# Patient Record
Sex: FEMALE | Race: OTHER | Employment: FULL TIME | URBAN - METROPOLITAN AREA
[De-identification: names, ages, dates, MRNs, and addresses within clinical notes are randomized per-mention and may not be internally consistent; named-entity substitution may affect disease eponyms.]

---

## 2018-11-29 ENCOUNTER — HOSPITAL ENCOUNTER (EMERGENCY)
Age: 43
Discharge: HOME | End: 2018-11-29
Attending: PHYSICIAN ASSISTANT
Payer: SELF-PAY

## 2018-11-29 VITALS
SYSTOLIC BLOOD PRESSURE: 112 MMHG | HEART RATE: 81 BPM | DIASTOLIC BLOOD PRESSURE: 89 MMHG | OXYGEN SATURATION: 98 % | RESPIRATION RATE: 14 BRPM | WEIGHT: 179 LBS | HEIGHT: 62 IN | TEMPERATURE: 98.3 F | BODY MASS INDEX: 32.94 KG/M2

## 2018-11-29 DIAGNOSIS — Z01.419 NORMAL PELVIC EXAM: Primary | ICD-10-CM

## 2018-11-29 PROCEDURE — 99283 EMERGENCY DEPT VISIT LOW MDM: CPT

## 2018-11-30 NOTE — DISCHARGE INSTRUCTIONS
Examen pélvico: Instrucciones de cuidado - [ Pelvic Exam: Care Instructions ]  Instrucciones de cuidado    Cuando lisa Leonette Marylou se llama examen pélvico. Dos buenas razones para hacerse esta clase de examen son detectar infecciones de transmisión sexual (STI, por echo siglas en inglés) y hacerse louisa prueba de Papanicolaou. [place] prueba de Papanicolaou detecta cambios iniciales que pueden conducir a cáncer de isaias uterino. A veces, un examen pélvico es parte de un control regular. En [employer], usted puede hacer algunas cosas para que los [place] de echo pruebas tanner tan precisos reggie sea posible. · Trate de programar lisa examen cuando no tenga lisa período. · No se rose lavados vaginales, ni use tampones, medicamentos vaginales, aerosoles o talcos por 24 horas antes de lisa examen. · No tenga relaciones sexuales por 24 horas antes de lisa examen. Otras veces, las mujeres que tienen esta clase de exámenes en cualquier momento del mes. [place] es porque tienen dolor pélvico, sangrado o secreción. O pueden tener otro problema pélvico.  Antes del examen, es importante que comparta cierta información con lisa médico. Por ejemplo, si es louisa sobreviviente de louisa violación o de abuso sexual, usted puede hablar acerca de cualquier inquietud que pueda tener. Lisa médico también querrá saber si usted está embarazada o si [place] algún anticonceptivo. Y querrá saber de cualquier problema, cirugía o procedimiento que haya tenido en lisa georgia pélvica. También tendrá que decirle a lisa médico cuándo tuvo lisa último período. La atención de seguimiento es louisa parte clave de lisa tratamiento y seguridad. Asegúrese de hacer y acudir a todas las citas, y llame a lisa médico si está teniendo problemas. También es louisa buena idea saber los resultados de echo exámenes y mantener louisa lista de los medicamentos que get.   ¿Cómo se hace un examen pélvico?  · Chanell el examen pélvico usted:  ? Se quitará la ropa de Birtha Rose para abajo. María Schneiders un cobertor de papel o de ijeoma para ponerse sobre la mitad inferior de lisa cuerpo. ? Estará Ricka Ana boca arriba en la green de examen. Tendrá las piernas elevadas por encima de la línea de lisa cuerpo. Apoyará los pies sobre estribos. · El médico:  ? Le pedirá que relaje Keli Burow. Tiene que abrir las rodillas apuntando [employer] ku. ? Revisará la abertura de lisa vagina para detectar llagas o hinchazón. ? Le colocará suavemente en la vagina un instrumento llamado espéculo. Julisa abre la vagina un poco. Usted sentirá algo de presión. [place] si se relaja, no dolerá. Luis Llorens Torres le permite a lisa médico arron el interior de la vagina. ? Usará un cepillo pequeño, louisa espátula o un hisopo de algodón para sasha Pat Mars de células, si le va a hacer louisa prueba de Papanicolaou o un cultivo. Después el médico extraerá el espéculo. ? Se pondrá guantes y le colocará en la vagina fany o dos dedos de Amber. La otra mano va sobre la parte inferior de lisa abdomen. Luis Llorens Torres le permite a lisa médico sentir ehco [place]. Probablemente sienta algo de presión. Trate de permanecer relajada. ? Le pondrá un dedo enguantado en el recto y otro en la vagina, si es necesario. [id number] [facility] puede ser útil para revisar los [place]. Julisa examen dura aproximadamente 10 minutos. Al final, le darán louisa toallita o un pañuelo de papel para que se limpie la georgia vaginal. Es normal que tenga alguna secreción después de julisa examen. Consuelo Search vestirse. Los resultados de algunos de los exámenes pueden estar disponibles de inmediato. [place] los resultados de un cultivo o prueba de Papanicolaou pueden tardar desde unos días hasta un par de semanas. ¿Por qué debería hacerse un examen pélvico?  · Usted quiere hacerse las pruebas de detección recomendadas. Luis Llorens Torres incluye louisa prueba de Papanicolaou.   · Usted piensa que tiene louisa infección vaginal. Las señales incluyen picazón, ardor o louisa secreción fuera de lo normal.  · Podría mikey estado expuesta a louisa infección de transmisión sexual (STI, por echo siglas en inglés), reggie clamidia o herpes. · Tiene sangrado vaginal fuera de lisa período menstrual normal.  · Tiene dolor en el abdomen o la pelvis. · Ha sido víctima de [facility] agresión sexual. El examen pélvico le permite al médico recoger evidencias y revisar si hay STI. · Está embarazada. · Tiene problemas para Chelieffie Kay. ¿Cuáles son los riesgos de un examen pélvico?  El examen pélvico no tiene riesgos. ¿Cuándo debe pedir ayuda? Llame al 911 en cualquier momento que considere que necesita atención de [place]. Por ejemplo, llame si:    · Se desmayó (perdió el conocimiento).     · Tiene un dolor repentino e intenso en el abdomen o la pelvis.    Llame a lisa médico ahora mismo o busque atención médica inmediata si:    · Tiene un dolor nuevo en el abdomen o la pelvis.     · Tiene fiebre y dolor pélvico o flujo vaginal.    Preste especial atención a los cambios en lisa lay y asegúrese de comunicarse con lisa médico si:    · El dolor faby las relaciones sexuales aumenta.     · No mejora reggie se esperaba. ¿Dónde puede encontrar más información en inglés? Stephen Jeffrey a http://orion-tena.info/. Mark Samantha O554 en la búsqueda para aprender más acerca de \"Examen pélvico: Instrucciones de cuidado - [ Pelvic Exam: Care Instructions ]. \"  Revisado: 15 aguilar, 2018  Versión del contenido: 11.8  © 0053-8169 [employer]wise, Incorporated. Las instrucciones de cuidado fueron adaptadas bajo licencia por [employer] (which disclaims liability or warranty for this information). Si usted tiene [place] afección médica o sobre estas instrucciones, siempre pregunte a lisa profesional de lay. [facility] niega toda garantía o responsabilidad por lisa uso de esta información.

## 2018-11-30 NOTE — ED PROVIDER NOTES
EMERGENCY DEPARTMENT HISTORY AND PHYSICAL EXAM 
 
Date: 11/29/2018 Patient Name: Danielle Gordon History of Presenting Illness Chief Complaint Patient presents with  Foreign Body in Vagina History Provided By: Patient Chief Complaint: Missing tampon Duration: 7 hours Timing:  Acute Location: Vagina Associated Symptoms: denies any other associated signs or symptoms Additional History (Context):  
8:16 PM 
Danielle Gordon is a 44 y.o. female who presents to the emergency department C/O missing tampon from vagina, onset 7 hours ago. Pt states that she placed a tampon 8 hours ago and checked an hour later but did not find it. Pt is currently on menses. Pt denies vaginal pain, fever, chills, or any other sxs or complaints. PCP: Regan Morel MD 
 
Current Outpatient Medications Medication Sig Dispense Refill  amoxicillin 500 mg tab Take 500 mg by mouth three (3) times daily. 30 Tab 0  
 traMADol (ULTRAM) 50 mg tablet Take 1 Tab by mouth every six (6) hours as needed for Pain. Max Daily Amount: 200 mg. 30 Tab 0  
 albuterol (PROVENTIL HFA, VENTOLIN HFA) 90 mcg/actuation inhaler Take 1 Puff by inhalation every four (4) hours as needed. Past History Past Medical History: 
Past Medical History:  
Diagnosis Date  Asthma  Chronic kidney disease   
 hx stones  GERD (gastroesophageal reflux disease)  Psychiatric disorder   
 depression/anxiety  Unspecified adverse effect of anesthesia 14yrs ago states \"heart stopped\" with anest. during c/s in [place] Past Surgical History: 
Past Surgical History:  
Procedure Laterality Date  HX GYN    
 c/s x 2  
 HX ORTHOPAEDIC    
 rt foot surgery Family History: 
History reviewed. No pertinent family history. Social History: 
Social History Tobacco Use  Smoking status: Passive Smoke Exposure - Never Smoker  Tobacco comment: smokes when drinking at times Substance Use Topics  Alcohol use: Yes Alcohol/week: 3.0 oz Types: 6 Cans of beer per week Comment: per week  Drug use: No  
 
 
Allergies: Allergies Allergen Reactions  Percocet [Oxycodone-Acetaminophen] Nausea and Vomiting Pt said she can take percocet.  Vicodin [Hydrocodone-Acetaminophen] Nausea and Vomiting Review of Systems Review of Systems Constitutional: Negative for chills and fever. Genitourinary: Positive for vaginal bleeding (on menses). Negative for vaginal pain. All other systems reviewed and are negative. Physical Exam  
 
Vitals:  
 11/29/18 1952 BP: 112/89 Pulse: 81 Resp: 14 Temp: 98.3 °F (36.8 °C) SpO2: 98% Weight: 81.2 kg (179 lb) Height: 5' 2\" (1.575 m) Physical Exam  
Constitutional: She is oriented to person, place, and time. She appears well-developed and well-nourished. Alert, non toxic HENT:  
Head: Normocephalic and atraumatic. Neck: Normal range of motion. Neck supple. Cardiovascular: Normal rate, regular rhythm, normal heart sounds and intact distal pulses. No murmur heard. Pulmonary/Chest: Effort normal and breath sounds normal. No respiratory distress. She has no wheezes. She has no rales. Abdominal: Soft. Bowel sounds are normal. There is no tenderness. Genitourinary:  
Genitourinary Comments: + vaginal bleeding, no FB or tampon seen Neurological: She is alert and oriented to person, place, and time. Psychiatric: She has a normal mood and affect. Judgment normal.  
Nursing note and vitals reviewed. Diagnostic Study Results Labs - No results found for this or any previous visit (from the past 12 hour(s)). Radiologic Studies - No orders to display CT Results  (Last 48 hours) None CXR Results  (Last 48 hours) None Medical Decision Making I am the first provider for this patient.  
 
I reviewed the vital signs, available nursing notes, past medical history, past surgical history, family history and social history. Vital Signs-Reviewed the patient's vital signs. Pulse Oximetry Analysis - 98% on RA Records Reviewed: Nursing Notes Discussion: pt currently on menstrual period, sts she used a tampon and was unable to finding it when she went to remove it. + vaginal bleeding with mild lower abd cramping which is typical with her periods. No FB or tampon seen on exam.  
 
Procedures: 
Pelvic Exam 
Date/Time: 11/29/2018 8:19 PM 
Performed by: PA 
Procedure duration:  5 minutes. Documented by:  MARCELINO Allen. As dictated by:  Constance Landrum PA-C Exam assisted by:  Jennifer Avalos. Type of exam performed: speculum. External genitalia appearance: normal.   
Vaginal exam:  normal.   
Cervical exam:  normal.   
Specimen(s) collected:  none. Patient tolerance: Patient tolerated the procedure well with no immediate complications Comments: No tampon in vagina MEDICATIONS GIVEN: 
Medications - No data to display ED Course:  
8:16 PM  
Initial assessment performed. The patients presenting problems have been discussed, and they are in agreement with the care plan formulated and outlined with them. I have encouraged them to ask questions as they arise throughout their visit. Diagnosis and Disposition DISCHARGE NOTE: 
8:28 PM 
Max Muniretelvina Sheila's  results have been reviewed with her. She has been counseled regarding her diagnosis, treatment, and plan. She verbally conveys understanding and agreement of the signs, symptoms, diagnosis, treatment and prognosis and additionally agrees to follow up as discussed. She also agrees with the care-plan and conveys that all of her questions have been answered.   I have also provided discharge instructions for her that include: educational information regarding their diagnosis and treatment, and list of reasons why they would want to return to the ED prior to their follow-up appointment, should her condition change. She has been provided with education for proper emergency department utilization. CLINICAL IMPRESSION: 
 
1. Normal pelvic exam   
 
 
PLAN: 
1. D/C Home 2. Discharge Medication List as of 11/29/2018  8:28 PM  
  
 
3. Follow-up Information Follow up With Specialties Details Why Contact Info Gabriela Thomas MD [facility] Schedule an appointment as soon as possible for a visit in 3 days For primary care follow up [facility] [address] [id number] 
[phone] [facility] EMERGENCY DEPT Emergency Medicine  As needed, If symptoms worsen [address] [id number] 
[phone]  
  
 
_______________________________ Attestations: This note is prepared by THEMA, acting as Scribe for Osmar Diop PA-C. Osmar Diop PA-C:  The scribe's documentation has been prepared under my direction and personally reviewed by me in its entirety. I confirm that the note above accurately reflects all work, treatment, procedures, and medical decision making performed by me. 
 
_______________________________

## 2019-02-05 ENCOUNTER — HOSPITAL ENCOUNTER (EMERGENCY)
Age: 44
Discharge: HOME | End: 2019-02-05
Attending: EMERGENCY MEDICINE
Payer: MEDICAID

## 2019-02-05 VITALS
OXYGEN SATURATION: 100 % | HEIGHT: 62 IN | HEART RATE: 80 BPM | TEMPERATURE: 98.2 F | BODY MASS INDEX: 31.83 KG/M2 | SYSTOLIC BLOOD PRESSURE: 132 MMHG | RESPIRATION RATE: 18 BRPM | DIASTOLIC BLOOD PRESSURE: 88 MMHG | WEIGHT: 173 LBS

## 2019-02-05 DIAGNOSIS — N64.4 PAIN OF BOTH BREASTS: Primary | ICD-10-CM

## 2019-02-05 PROCEDURE — 93005 ELECTROCARDIOGRAM TRACING: CPT

## 2019-02-05 PROCEDURE — 74011250636 HC RX REV CODE- 250/636: Performed by: PHYSICIAN ASSISTANT

## 2019-02-05 PROCEDURE — 96372 THER/PROPH/DIAG INJ SC/IM: CPT

## 2019-02-05 PROCEDURE — 99284 EMERGENCY DEPT VISIT MOD MDM: CPT

## 2019-02-05 RX ORDER — IBUPROFEN 200 MG
200 TABLET ORAL
COMMUNITY
End: 2019-02-05

## 2019-02-05 RX ORDER — KETOROLAC TROMETHAMINE 30 MG/ML
30 INJECTION, SOLUTION INTRAMUSCULAR; INTRAVENOUS
Status: COMPLETED | OUTPATIENT
Start: 2019-02-05 | End: 2019-02-05

## 2019-02-05 RX ORDER — ACETAMINOPHEN 500 MG
500 TABLET ORAL
Qty: 20 TAB | Refills: 0 | Status: SHIPPED | OUTPATIENT
Start: 2019-02-05 | End: 2019-07-15

## 2019-02-05 RX ORDER — DIPHENHYDRAMINE HCL 25 MG
25 CAPSULE ORAL
COMMUNITY
End: 2019-07-15

## 2019-02-05 RX ORDER — IBUPROFEN 600 MG/1
600 TABLET ORAL
Qty: 20 TAB | Refills: 0 | Status: SHIPPED | OUTPATIENT
Start: 2019-02-05 | End: 2019-07-15

## 2019-02-05 RX ORDER — LIDOCAINE 40 MG/G
CREAM TOPICAL
Qty: 15 G | Refills: 0 | Status: SHIPPED | OUTPATIENT
Start: 2019-02-05 | End: 2019-07-15

## 2019-02-05 RX ADMIN — KETOROLAC TROMETHAMINE 30 MG: 30 INJECTION, SOLUTION INTRAMUSCULAR at 11:46

## 2019-02-05 NOTE — DISCHARGE INSTRUCTIONS
Dolor de senos: Instrucciones de cuidado - [ Breast Pain: Care Instructions ]  Instrucciones de cuidado    La sensibilidad y el dolor de senos podría aparecer y desaparecer con los periodos menstruales (cíclico) o podría no seguir ningún patrón (no cíclico). El dolor de senos chris vez es causado por un problema de lay grave. Podría ser necesario hacer pruebas para averiguar la causa. La atención de seguimiento es louisa parte clave de lisa tratamiento y seguridad. Asegúrese de hacer y acudir a todas las citas, y llame a lisa médico si está teniendo problemas. También es louisa buena idea saber los resultados de echo exámenes y mantener louisa lista de los medicamentos que get. ¿Cómo puede cuidarse en el hogar? · Si lisa médico le recetó un medicamento, tómelo exactamente según las indicaciones. Llame a lisa médico si nesha estar teniendo problemas con lisa medicamento. · Para aliviar el dolor y la hinchazón, tome un analgésico (medicamento para el dolor) de venta arvin, reggie acetaminofén (Tylenol), ibuprofeno (Advil, Motrin) o naproxeno (Aleve). Kay y siga todas las instrucciones de la [place]. · No tome dos o más analgésicos al [employer], a menos que el médico se lo haya indicado. Muchos analgésicos contienen acetaminofén, es decir, Tylenol. El exceso de acetaminofén (Tylenol) puede ser dañino. · Use un sostén que le dé buen soporte, reggie los que se usan para hacer deporte o correr. · Reduzca la cantidad de grasa en lisa dieta. Si necesita ayuda para planificar comidas saludables, consulte a un dietista. · Reanna por lo menos 30 minutos de ejercicio la mayoría de los días de la [place]. Caminar es louisa buena opción. Es posible que también quiera hacer otras actividades, reggie correr, nadar, [employer], o jugar al tenis o practicar otros deportes de equipo. · Mantenga un patrón de sueño saludable. Sydney Lipoma a dormir a la misma hora todas las noches y levántese a la misma hora cada día. ¿Cuándo debe pedir ayuda?   Llame a lisa médico ahora mismo o busque atención médica inmediata si:    · Tiene cambios nuevos en un seno, reggie:  ? Un bulto o engrosamiento en el seno o la axila. ? Un cambio en el tamaño o la forma del seno. ? Cambios en la piel, reggie hoyuelos o arrugas. ? Secreción de [employer]. ? Un cambio en el color o la textura de la piel del seno o la georgia oscura alrededor del pezón (areola). ? Un cambio en la forma del pezón (podría parecer reggie si estuviera hundido en el seno).     · Tiene síntomas de infección en el seno, tales reggie:  ? Aumento del dolor, la hinchazón, el enrojecimiento o la temperatura alrededor del seno. ? Vetas rojizas que se extienden desde el seno. ? Pus que supura de un seno. ? Jeison Doll especial atención a los cambios en lisa lay y asegúrese de comunicarse con lisa médico si:    · Lisa dolor de los senos no disminuye después de 1 semana.     · Tiene un bulto o engrosamiento en el seno o la axila. ¿Dónde puede encontrar más información en inglés? Sanket Chamorro a http://orion-tena.info/. Mario Alberto Landry H374 en la búsqueda para aprender más acerca de \"Dolor de senos: Instrucciones de cuidado - [ Breast Pain: Care Instructions ]. \"  Revisado: 23 septiembre, 2018  Versión del contenido: 11.9  © 2356-6789 [employer]. Las instrucciones de cuidado fueron adaptadas bajo licencia por [employer] (which disclaims liability or warranty for this information). Si usted tiene [place] afección médica o sobre estas instrucciones, siempre pregunte a lisa profesional de lay. [employer] niega toda garantía o responsabilidad por lisa uso de esta información.

## 2019-02-05 NOTE — ED PROVIDER NOTES
EMERGENCY DEPARTMENT HISTORY AND PHYSICAL EXAM 
 
Date: 2/5/2019 Patient Name: Rubin Zarate History of Presenting Illness Chief Complaint Patient presents with  Breast pain History Provided By: Patient Chief Complaint: breast pain Duration: 3 Weeks Timing:  Acute Location: R>L Associated Symptoms: breast swelling, breast itching/burning, and chills. Additional History (Context):  
11:23 AM 
Rubin Zarate is a 44 y.o. female with PMHX of right breast excision with wire localization surgery in August 2018 while living in [place]  who presents to the emergency department C/O breast pain (R>L) onset a few months ago but worsened 3 weeks ago. Associated symptoms include breast swelling, breast itching/burning, and chills. . Initially notice drainage from the breast but resolved. Symptoms more significant in the right breast. States the pain is radiating to her chest. Last week, fever 102F and took Motrin with relief. She is taking Tramadol for pain relief. She is supposed to follow up with surgeon every 6 months but since moved to [address] [facility] and pt does not have health insurance. Pt denies any other Sx or complaints. PCP: None Current Outpatient Medications Medication Sig Dispense Refill  diphenhydrAMINE (BENADRYL) 25 mg capsule Take 25 mg by mouth every six (6) hours as needed.  ibuprofen (MOTRIN) 600 mg tablet Take 1 Tab by mouth every six (6) hours as needed for Pain. 20 Tab 0  
 acetaminophen (TYLENOL) 500 mg tablet Take 1 Tab by mouth every four (4) hours as needed for Pain. 20 Tab 0  
 lidocaine (XYLOCAINE) 4 % topical cream Apply  to affected area three (3) times daily as needed for Pain. 15 g 0  
 colloidal oatmeal (EUCERIN ECZEMA RELIEF) 1 % crea Apply thin layer to breasts twice daily. 140 g 0  
 traMADol (ULTRAM) 50 mg tablet Take 1 Tab by mouth every six (6) hours as needed for Pain.  Max Daily Amount: 200 mg. 30 Tab 0  
  albuterol (PROVENTIL HFA, VENTOLIN HFA) 90 mcg/actuation inhaler Take 1 Puff by inhalation every four (4) hours as needed. Past History Past Medical History: 
Past Medical History:  
Diagnosis Date  Asthma  Chronic kidney disease   
 hx stones  GERD (gastroesophageal reflux disease)  Psychiatric disorder   
 depression/anxiety  Unspecified adverse effect of anesthesia 14yrs ago states \"heart stopped\" with anest. during c/s in [place] Past Surgical History: 
Past Surgical History:  
Procedure Laterality Date  HX GYN    
 c/s x 2  
 HX ORTHOPAEDIC    
 rt foot surgery Family History: 
History reviewed. No pertinent family history. Social History: 
Social History Tobacco Use  Smoking status: Current Every Day Smoker Packs/day: 1.00  Smokeless tobacco: Never Used  Tobacco comment: smokes when drinking at times Substance Use Topics  Alcohol use: Yes Alcohol/week: 3.0 oz Types: 6 Cans of beer per week Comment: per week  Drug use: No  
 
 
Allergies: Allergies Allergen Reactions  Percocet [Oxycodone-Acetaminophen] Nausea and Vomiting Pt said she can take percocet.  Vicodin [Hydrocodone-Acetaminophen] Nausea and Vomiting Review of Systems Review of Systems Constitutional: Positive for chills and fever (resolved). Cardiovascular: Positive for chest pain ( breast pain). Musculoskeletal: Positive for myalgias (breast pain). (+) breast swelling 
(+) breast itching/burning All other systems reviewed and are negative. Physical Exam  
 
Vitals:  
 02/05/19 1052 BP: 132/88 Pulse: 80 Resp: 18 Temp: 98.2 °F (36.8 °C) SpO2: 100% Weight: 78.5 kg (173 lb) Height: 5' 2\" (1.575 m) Physical Exam  
Constitutional: She appears well-developed and well-nourished. No distress. HENT:  
Head: Normocephalic and atraumatic.   
Right Ear: External ear normal.  
Left Ear: External ear normal.  
 Nose: Nose normal.  
Eyes: Conjunctivae are normal.  
Neck: Normal range of motion. Cardiovascular: Normal rate, regular rhythm and normal heart sounds. Pulmonary/Chest: Effort normal and breath sounds normal. No respiratory distress. She has no wheezes. Bilateral breasts have reproducible tenderness to palpation, R>L. Minimal scaling of skin near the areola bilaterally. No nipple discharge or obvious dimpling of the breast bilaterally. No masses, induration or fluctuance bilaterally. Neurological: She is alert. Skin: Skin is warm and dry. She is not diaphoretic. Psychiatric: She has a normal mood and affect. Nursing note and vitals reviewed. Diagnostic Study Results Labs - Recent Results (from the past 12 hour(s)) EKG, 12 LEAD, INITIAL Collection Time: 02/05/19 11:40 AM  
Result Value Ref Range Ventricular Rate 64 BPM  
 Atrial Rate 64 BPM  
 P-R Interval 124 ms QRS Duration 84 ms Q-T Interval 416 ms  
 QTC Calculation (Bezet) 429 ms Calculated P Axis 26 degrees Calculated R Axis 0 degrees Calculated T Axis 17 degrees Diagnosis Normal sinus rhythm Normal ECG Radiologic Studies - No orders to display CT Results  (Last 48 hours) None CXR Results  (Last 48 hours) None Medications given in the ED- Medications  
ketorolac (TORADOL) injection 30 mg (30 mg IntraMUSCular Given 2/5/19 1146) Medical Decision Making I am the first provider for this patient. I reviewed the vital signs, available nursing notes, past medical history, past surgical history, family history and social history. Vital Signs-Reviewed the patient's vital signs. Pulse Oximetry Analysis - 100% on RA Records Reviewed: Nursing Notes and Old Medical Records Provider Notes (Medical Decision Making): Appears well hydrated and non toxic, presenting with months of breast pain, worse over the past 3 weeks. Reports itching, burning and scaling skin. No obvious lesions or masses to suggest abscess or cellulitis. EKG unremarkable. Will tx pain. Patient is to follow up with [facility] today at 1500 for further evaluation. Based on today's assessment, I feel the patient is stable for discharge to home with outpatient follow up. Return precautions and referrals provided. Procedures: 
Procedures ED Course:  
11:23 AM Initial assessment performed. The patients presenting problems have been discussed, and they are in agreement with the care plan formulated and outlined with them. I have encouraged them to ask questions as they arise throughout their visit. 11:30 AM Discussed patient's history, exam, and available diagnostics results with Vasiliy Rojas MD, ED attending, who recommends anti-inflammatory  for pain,  consult, and EKG. 11:54 AM Alexandra Bauer, recommends pt follow up with [facility] for further evaluation. Pt given information for [facility] and how to file for Medicaid in [place]. 12:05 PM Alexandra Bauer, states they to get her an appointment with [facility] today at 3pm.  
 
Diagnosis and Disposition DISCHARGE NOTE: 
12:05 PM 
Kaden Frausto Hill's  results have been reviewed with her. She has been counseled regarding her diagnosis, treatment, and plan. She verbally conveys understanding and agreement of the signs, symptoms, diagnosis, treatment and prognosis and additionally agrees to follow up as discussed. She also agrees with the care-plan and conveys that all of her questions have been answered. I have also provided discharge instructions for her that include: educational information regarding their diagnosis and treatment, and list of reasons why they would want to return to the ED prior to their follow-up appointment, should her condition change. She has been provided with education for proper emergency department utilization. CLINICAL IMPRESSION: 
 
1. Pain of both breasts PLAN: 1. D/C Home 2. Discharge Medication List as of 2/5/2019 12:06 PM  
  
START taking these medications Details  
acetaminophen (TYLENOL) 500 mg tablet Take 1 Tab by mouth every four (4) hours as needed for Pain., Print, Disp-20 Tab, R-0  
  
lidocaine (XYLOCAINE) 4 % topical cream Apply  to affected area three (3) times daily as needed for Pain., Print, Disp-15 g, R-0  
  
colloidal oatmeal (EUCERIN ECZEMA RELIEF) 1 % crea Apply thin layer to breasts twice daily. , Print, Disp-140 g, R-0  
  
  
CONTINUE these medications which have CHANGED Details  
ibuprofen (MOTRIN) 600 mg tablet Take 1 Tab by mouth every six (6) hours as needed for Pain., Print, Disp-20 Tab, R-0  
  
  
CONTINUE these medications which have NOT CHANGED Details diphenhydrAMINE (BENADRYL) 25 mg capsule Take 25 mg by mouth every six (6) hours as needed., Historical Med  
  
traMADol (ULTRAM) 50 mg tablet Take 1 Tab by mouth every six (6) hours as needed for Pain. Max Daily Amount: 200 mg., Print, Disp-30 Tab, R-0  
  
albuterol (PROVENTIL HFA, VENTOLIN HFA) 90 mcg/actuation inhaler Take 1 Puff by inhalation every four (4) hours as needed., Historical Med 3. Follow-up Information Follow up With Specialties Details Why Contact Info [address] today For primary care follow up at [address] [facility] [address] [place] [address] [address] 
[phone] [facility] EMERGENCY DEPT Emergency Medicine Go to As needed, as symptoms worsen [address] [place] Code [address] 
[phone]  
  
 
_______________________________ Attestations: This note is prepared by Corby Stevens, acting as Scribe for Vinnie Hernadez PA-C. Vinnie Hernadez PA-C:  The scribe's documentation has been prepared under my direction and personally reviewed by me in its entirety. I confirm that the note above accurately reflects all work, treatment, procedures, and medical decision making performed by me. _______________________________

## 2019-02-25 ENCOUNTER — HOSPITAL ENCOUNTER (EMERGENCY)
Age: 44
Discharge: HOME | End: 2019-02-25
Attending: EMERGENCY MEDICINE
Payer: MEDICAID

## 2019-02-25 VITALS
TEMPERATURE: 98.7 F | DIASTOLIC BLOOD PRESSURE: 96 MMHG | HEART RATE: 70 BPM | WEIGHT: 172 LBS | HEIGHT: 62 IN | RESPIRATION RATE: 16 BRPM | SYSTOLIC BLOOD PRESSURE: 144 MMHG | BODY MASS INDEX: 31.65 KG/M2 | OXYGEN SATURATION: 99 %

## 2019-02-25 DIAGNOSIS — L30.9 DERMATITIS: Primary | ICD-10-CM

## 2019-02-25 DIAGNOSIS — L30.9 NIPPLE DERMATITIS: ICD-10-CM

## 2019-02-25 PROCEDURE — 99282 EMERGENCY DEPT VISIT SF MDM: CPT

## 2019-02-25 RX ORDER — HYDROXYZINE 50 MG/1
50 TABLET, FILM COATED ORAL
Qty: 20 TAB | Refills: 0 | Status: SHIPPED | OUTPATIENT
Start: 2019-02-25 | End: 2019-03-07

## 2019-02-25 RX ORDER — PREDNISONE 10 MG/1
TABLET ORAL
Qty: 21 TAB | Refills: 0 | Status: SHIPPED | OUTPATIENT
Start: 2019-02-25 | End: 2019-07-15

## 2019-02-25 RX ORDER — TRIAMCINOLONE ACETONIDE 5 MG/G
CREAM TOPICAL 2 TIMES DAILY
Qty: 15 G | Refills: 0 | Status: SHIPPED | OUTPATIENT
Start: 2019-02-25 | End: 2019-07-15

## 2019-02-25 NOTE — ED TRIAGE NOTES
Triage: pt states that she had breast biopsy in August 18. Pt reports right breast pain x 2 months. Burning sensation, nipple discharge. Pt states that she also has pain to left breast. 
 
Pt scheduled for mammogram tomorrow.

## 2019-02-25 NOTE — ED PROVIDER NOTES
EMERGENCY DEPARTMENT HISTORY AND PHYSICAL EXAM 
 
Date: 2019 Patient Name: Americo Segundo History of Presenting Illness Chief Complaint Patient presents with  Breast pain History Provided By: Patient Chief Complaint: diffuse rash Duration: 2 months Timing:  Constant Location: bilateral legs Severity: 6 out of 10 Other Symptoms: left breast pain, right breast pain, redness, itchiness Additional History (Context):  
3:48 PM  
Americo Segundo is a 44 y.o. female with PMHX of asthma, GERD, CKD, and anxiety depression who presents to the emergency department C/O diffuse rash on legs onset 2 months ago. Pt also c/o right and left breast pain. Pt states that she had breast surgery (for a mass) on  and has some redness and itchiness with burning on the right breast for 2 months. Pt also reporting left breast pain. Pt states she has tried Aspercreme with topical Benadryl. Pt states that she is scheduled for a mammogram tomorrow. Allergy reported to Percocet and Vicodin. Other PSHx of right foot surgery and . Pt denies other CP, SOB, and any other sxs or complaints. PCP: None Current Outpatient Medications Medication Sig Dispense Refill  predniSONE (STERAPRED DS) 10 mg dose pack Use directed 21 Tab 0  
 hydrOXYzine HCl (ATARAX) 50 mg tablet Take 1 Tab by mouth every six (6) hours as needed for Itching for up to 10 days. 20 Tab 0  
 triamcinolone (ARISTOCORT) 0.5 % topical cream Apply  to affected area two (2) times a day. use thin layer 15 g 0  
 lidocaine (XYLOCAINE) 4 % topical cream Apply  to affected area three (3) times daily as needed for Pain. 15 g 0  
 diphenhydrAMINE (BENADRYL) 25 mg capsule Take 25 mg by mouth every six (6) hours as needed.  ibuprofen (MOTRIN) 600 mg tablet Take 1 Tab by mouth every six (6) hours as needed for Pain.  20 Tab 0  
 acetaminophen (TYLENOL) 500 mg tablet Take 1 Tab by mouth every four (4) hours as needed for Pain. 20 Tab 0  
 colloidal oatmeal (EUCERIN ECZEMA RELIEF) 1 % crea Apply thin layer to breasts twice daily. 140 g 0  
 traMADol (ULTRAM) 50 mg tablet Take 1 Tab by mouth every six (6) hours as needed for Pain. Max Daily Amount: 200 mg. 30 Tab 0  
 albuterol (PROVENTIL HFA, VENTOLIN HFA) 90 mcg/actuation inhaler Take 1 Puff by inhalation every four (4) hours as needed. Past History Past Medical History: 
Past Medical History:  
Diagnosis Date  Asthma  Chronic kidney disease   
 hx stones  GERD (gastroesophageal reflux disease)  Psychiatric disorder   
 depression/anxiety  Unspecified adverse effect of anesthesia 14yrs ago states \"heart stopped\" with anest. during c/s in [place] Past Surgical History: 
Past Surgical History:  
Procedure Laterality Date  HX GYN    
 c/s x 2  
 HX ORTHOPAEDIC    
 rt foot surgery Family History: 
History reviewed. No pertinent family history. Social History: 
Social History Tobacco Use  Smoking status: Current Some Day Smoker Packs/day: 1.00  Smokeless tobacco: Never Used  Tobacco comment: smokes when drinking at times Substance Use Topics  Alcohol use: Yes Alcohol/week: 3.0 oz Types: 6 Cans of beer per week Comment: per week  Drug use: No  
 
 
Allergies: Allergies Allergen Reactions  Percocet [Oxycodone-Acetaminophen] Nausea and Vomiting Pt said she can take percocet.  Vicodin [Hydrocodone-Acetaminophen] Nausea and Vomiting Review of Systems Review of Systems Respiratory: Negative for shortness of breath. Cardiovascular: Negative for chest pain. Skin: Positive for color change (redness) and rash. (+) Itchiness 
(+) Right breast pain 
(+) Left breast pain All other systems reviewed and are negative. Physical Exam  
 
Vitals:  
 02/25/19 1508 BP: (!) 144/96 Pulse: 70 Resp: 16 Temp: 98.7 °F (37.1 °C) SpO2: 99% Weight: 78 kg (172 lb) Height: 5' 2\" (1.575 m) Physical Exam  
Constitutional: She is oriented to person, place, and time. She appears well-developed and well-nourished. No distress. HENT:  
Head: Normocephalic and atraumatic. Eyes: Conjunctivae and EOM are normal. Pupils are equal, round, and reactive to light. Neck: Normal range of motion. Neck supple. Cardiovascular: Normal rate and regular rhythm. Pulmonary/Chest: Effort normal and breath sounds normal.  
Musculoskeletal: Normal range of motion. Neurological: She is alert and oriented to person, place, and time. Skin: Skin is warm and dry. Dry skin with cracking & peeling noted to both nipples with right worse than left, no exudate erythema drainage or palpable lesions; few acreas of erythema & dryness most noted to left hand & BLE's; no superinfection exudates Psychiatric: She has a normal mood and affect. Her behavior is normal.  
Nursing note and vitals reviewed. Diagnostic Study Results Labs - No results found for this or any previous visit (from the past 12 hour(s)). Radiologic Studies - No orders to display CT Results  (Last 48 hours) None CXR Results  (Last 48 hours) None Medications given in the ED- Medications - No data to display Medical Decision Making I am the first provider for this patient. I reviewed the vital signs, available nursing notes, past medical history, past surgical history, family history and social history. Vital Signs-Reviewed the patient's vital signs. Pulse Oximetry Analysis - 99% on room air. Records Reviewed: Nursing Notes and Old Medical Records Procedures: 
Procedures ED Course:  
3:48 PM Initial assessment performed. The patients presenting problems have been discussed, and they are in agreement with the care plan formulated and outlined with them. I have encouraged them to ask questions as they arise throughout their visit. Discussion: 43yoF c/o itching & rash diffuse and to both nipples. Pt exam c/w dermitis without supra infection. Pt already had mammogram scheduled, no obvious masses noted on exam. Plan for steriods atarax & dermatology f/u as well as for mammo Diagnosis and Disposition DISCHARGE NOTE: 
4:03 PM 
Medina Hill's  results have been reviewed with her. She has been counseled regarding her diagnosis, treatment, and plan. She verbally conveys understanding and agreement of the signs, symptoms, diagnosis, treatment and prognosis and additionally agrees to follow up as discussed. She also agrees with the care-plan and conveys that all of her questions have been answered. I have also provided discharge instructions for her that include: educational information regarding their diagnosis and treatment, and list of reasons why they would want to return to the ED prior to their follow-up appointment, should her condition change. She has been provided with education for proper emergency department utilization. CLINICAL IMPRESSION: 
 
1. Dermatitis 2. Nipple dermatitis PLAN: 
1. D/C Home 2. Current Discharge Medication List  
  
START taking these medications Details  
predniSONE (STERAPRED DS) 10 mg dose pack Use directed Qty: 21 Tab, Refills: 0  
  
hydrOXYzine HCl (ATARAX) 50 mg tablet Take 1 Tab by mouth every six (6) hours as needed for Itching for up to 10 days. Qty: 20 Tab, Refills: 0  
  
triamcinolone (ARISTOCORT) 0.5 % topical cream Apply  to affected area two (2) times a day. use thin layer Qty: 15 g, Refills: 0  
  
  
 
3. Follow-up Information Follow up With Specialties Details Why Contact Info [facility]  Schedule an appointment as soon as possible for a visit For Primary Care Follow Up [address] [place], [address] [id number] [phone]  [facility] EMERGENCY DE[facility] Emergency Medicine Go to If symptoms worsen, As needed [address] 
 [address] 
[phone]  
 to your mammogram tomorrow Jeannie Vallejo MD Dermatology Schedule an appointment as soon as possible for a visit For Dermatology Follow Up [address] 
[phone] 
  
  
 
_______________________________ Attestations: This note is prepared by Vitor Morrison, acting as Scribe for [facility]. [facility]:  The scribe's documentation has been prepared under my direction and personally reviewed by me in its entirety. I confirm that the note above accurately reflects all work, treatment, procedures, and medical decision making performed by me. 
_______________________________

## 2019-02-25 NOTE — DISCHARGE INSTRUCTIONS
Dermatitis: Instrucciones de cuidado - [ Dermatitis: Care Instructions ]  Instrucciones de cuidado  Dermatitis es el nombre general de cualquier salpullido o inflamación de la piel. Los distintos tipos de dermatitis causan diferentes tipos de salpullido. [address] causas comunes del salpullido se encuentran los medicamentos nuevos, las plantas (reggie el zumaque venenoso o la hiedra venenosa), el calor y el estrés. Ciertas enfermedades también pueden causar un salpullido. [place] reacción alérgica a algo que toca la piel, reggie el látex, el níquel o la hiedra venenosa, se llama dermatitis de contacto. La dermatitis de contacto también puede estar causada por algo que irrita la piel, reggie la [place], louisa sustancia química o el jabón. Julisa tipo de salpullido no se puede transmitir de Chiara Ranjan persona a otra. La duración del salpullido depende de lisa causa. El salpullido puede durar unos días o meses. La atención de seguimiento es louisa parte clave de lisa tratamiento y seguridad. Asegúrese de hacer y acudir a todas las citas, y llame a lisa médico si está teniendo problemas. También es louisa buena idea saber los resultados de echo exámenes y mantener louisa lista de los medicamentos que get. ¿Cómo puede cuidarse en el hogar? · No se rasque el salpullido. Mantenga las uñas cortas y [place]. O use guantes si esto le ayuda a no rascarse. · Lávese la [place] solo con agua. Seque la [place] con toques suaves de toalla. · Colóquese paños húmedos y fríos sobre el salpullido para reducir la comezón. · Manténgase fresco y evite el sol. · Deje el salpullido en contacto con el aire tanto reggie sea posible. · Si el salpullido le da comezón, use crema de hidrocortisona. Siga las instrucciones de la etiqueta. La loción de calamina podría ayudar en el milagros del salpullido causado por plantas. · [place] un antihistamínico de venta [place], reggie difenhidramina (Benadryl) o loratadina (Claritin), para aliviar la comezón.  Kay y siga todas las indicaciones de la etiqueta. · Si lisa médico le recetó louisa crema, úsela según las indicaciones. Si lisa médico le recetó un medicamento, tómelo exactamente según las indicaciones. ¿Cuándo debe pedir ayuda? Llame a lisa médico ahora mismo o busque atención médica inmediata si:    · Tiene síntomas de infección, tales reggie:  ? Aumento del dolor, la hinchazón, la temperatura o el enrojecimiento. ? Vetas rojizas que salen de la [place]. ? Pus que sale de la [place]. ? Joycelyn Burrell.     · Tiene dolor en las articulaciones junto con el salpullido.    Preste especial atención a los cambios en lisa lay, y asegúrese de comunicarse con lisa médico si:    · El salpullido está cambiando o empeorando.     · No mejora reggie se esperaba. ¿Dónde puede encontrar más información en inglés? Delma Sutton a http://orion-tena.info/. Natalya Seymour Y712 en la búsqueda para aprender más acerca de \"Dermatitis: Instrucciones de cuidado - [ Dermatitis: Care Instructions ]. \"  Revisado: 17 marissa, 2018  Versión del contenido: 11.9  © 9835-1124 [employer]. Las instrucciones de cuidado fueron adaptadas bajo licencia por [employer] (which disclaims liability or warranty for this information). Si usted tiene [place] afección médica o sobre estas instrucciones, siempre pregunte a lisa profesional de lay. [employer], Incorporated niega toda garantía o responsabilidad por lisa uso de esta información.

## 2019-04-20 LAB
ATRIAL RATE: 64 BPM
CALCULATED P AXIS, ECG09: 26 DEGREES
CALCULATED R AXIS, ECG10: 0 DEGREES
CALCULATED T AXIS, ECG11: 17 DEGREES
DIAGNOSIS, 93000: NORMAL
P-R INTERVAL, ECG05: 124 MS
Q-T INTERVAL, ECG07: 416 MS
QRS DURATION, ECG06: 84 MS
QTC CALCULATION (BEZET), ECG08: 429 MS
VENTRICULAR RATE, ECG03: 64 BPM

## 2019-07-15 ENCOUNTER — HOSPITAL ENCOUNTER (EMERGENCY)
Age: 44
Discharge: HOME | End: 2019-07-15
Attending: EMERGENCY MEDICINE
Payer: MEDICAID

## 2019-07-15 VITALS
DIASTOLIC BLOOD PRESSURE: 62 MMHG | TEMPERATURE: 98.6 F | BODY MASS INDEX: 34.23 KG/M2 | HEART RATE: 82 BPM | SYSTOLIC BLOOD PRESSURE: 123 MMHG | WEIGHT: 186 LBS | RESPIRATION RATE: 16 BRPM | OXYGEN SATURATION: 100 % | HEIGHT: 62 IN

## 2019-07-15 DIAGNOSIS — R05.9 COUGH: ICD-10-CM

## 2019-07-15 DIAGNOSIS — J01.00 ACUTE NON-RECURRENT MAXILLARY SINUSITIS: Primary | ICD-10-CM

## 2019-07-15 DIAGNOSIS — H69.83 EUSTACHIAN TUBE DYSFUNCTION, BILATERAL: ICD-10-CM

## 2019-07-15 PROCEDURE — 99282 EMERGENCY DEPT VISIT SF MDM: CPT

## 2019-07-15 RX ORDER — AMOXICILLIN AND CLAVULANATE POTASSIUM 875; 125 MG/1; MG/1
1 TABLET, FILM COATED ORAL 2 TIMES DAILY
Qty: 20 TAB | Refills: 0 | Status: SHIPPED | OUTPATIENT
Start: 2019-07-15 | End: 2021-02-15

## 2019-07-15 NOTE — DISCHARGE INSTRUCTIONS
Tylenol/ibuprofen for pain  Continued use of Claritin and Flonase  Recommended use of pseudoephedrine  Medication as prescribed     Eustachian Tube Problems: Care Instructions  Your Care Instructions    The eustachian (say \"you-STAY-shee-un\") tubes run between the inside of the ears and the throat. They keep air pressure stable in the ears. If your eustachian tubes become blocked, the air pressure in your ears changes. The fluids from a cold can clog eustachian tubes, causing pain in the ears. A quick change in air pressure can cause eustachian tubes to close up. This might happen when an airplane changes altitude or when a  goes up or down underwater. Eustachian tube problems often clear up on their own or after antibiotic treatment. If your tubes continue to be blocked, you may need surgery. Follow-up care is a key part of your treatment and safety. Be sure to make and go to all appointments, and call your doctor if you are having problems. It's also a good idea to know your test results and keep a list of the medicines you take. How can you care for yourself at home? · To ease ear pain, apply a warm washcloth or a heating pad set on low. There may be some drainage from the ear when the heat melts earwax. Put a cloth between the heat source and your skin. Do not use a heating pad with children. · If your doctor prescribed antibiotics, take them as directed. Do not stop taking them just because you feel better. You need to take the full course of antibiotics. · Your doctor may recommend over-the-counter medicine. Be safe with medicines. Oral or nasal decongestants may relieve ear pain. Avoid decongestants that are combined with antihistamines, which tend to cause more blockage. But if allergies seem to be the problem, your doctor may recommend a combination. Be careful with cough and cold medicines.  Don't give them to children younger than 6, because they don't work for children that age and can even be harmful. For children 6 and older, always follow all the instructions carefully. Make sure you know how much medicine to give and how long to use it. And use the dosing device if one is included. When should you call for help? Call your doctor now or seek immediate medical care if:    · You develop sudden, complete hearing loss.     · You have severe pain or feel dizzy.     · You have new or increasing pus or blood draining from your ear.     · You have redness, swelling, or pain around or behind the ear.    Watch closely for changes in your health, and be sure to contact your doctor if:    · You do not get better after 2 weeks.     · You have any new symptoms, such as itching or a feeling of fullness in the ear. Where can you learn more? Go to http://orion-tena.info/. Enter Y822 in the search box to learn more about \"Eustachian Tube Problems: Care Instructions. \"  Current as of: March 27, 2018  Content Version: 11.9  © 1846-5664 [employer]. Care instructions adapted under license by [employer] (which disclaims liability or warranty for this information). If you have questions about a medical condition or this instruction, always ask your healthcare professional. [address] any warranty or liability for your use of this information. Sinusitis: Care Instructions  Your Care Instructions    Sinusitis is an infection of the lining of the sinus cavities in your head. Sinusitis often follows a cold. It causes pain and pressure in your head and face. In most cases, sinusitis gets better on its own in 1 to 2 weeks. But some mild symptoms may last for several weeks. Sometimes antibiotics are needed. Follow-up care is a key part of your treatment and safety. Be sure to make and go to all appointments, and call your doctor if you are having problems.  It's also a good idea to know your test results and keep a list of the medicines you take. How can you care for yourself at home? · Take an over-the-counter pain medicine, such as acetaminophen (Tylenol), ibuprofen (Advil, Motrin), or naproxen (Aleve). Read and follow all instructions on the label. · If the doctor prescribed antibiotics, take them as directed. Do not stop taking them just because you feel better. You need to take the full course of antibiotics. · Be careful when taking over-the-counter cold or flu medicines and Tylenol at the same time. Many of these medicines have acetaminophen, which is Tylenol. Read the labels to make sure that you are not taking more than the recommended dose. Too much acetaminophen (Tylenol) can be harmful. · Breathe warm, moist air from a steamy shower, a hot bath, or a sink filled with hot water. Avoid cold, dry air. Using a humidifier in your home may help. Follow the directions for cleaning the machine. · Use saline (saltwater) nasal washes to help keep your nasal passages open and wash out mucus and bacteria. You can buy saline nose drops at a grocery store or drugstore. Or you can make your own at home by adding 1 teaspoon of salt and 1 teaspoon of baking soda to 2 cups of distilled water. If you make your own, fill a bulb syringe with the solution, insert the tip into your nostril, and squeeze gently. [place] your nose. · Put a hot, wet towel or a warm gel pack on your face 3 or 4 times a day for 5 to 10 minutes each time. · Try a decongestant nasal spray like oxymetazoline (Afrin). Do not use it for more than 3 days in a row. Using it for more than 3 days can make your congestion worse. When should you call for help?   Call your doctor now or seek immediate medical care if:    · You have new or worse swelling or redness in your face or around your eyes.     · You have a new or higher fever.    Watch closely for changes in your health, and be sure to contact your doctor if:    · You have new or worse facial pain.     · The mucus from your nose becomes thicker (like pus) or has new blood in it.     · You are not getting better as expected. Where can you learn more? Go to http://orion-tena.info/. Enter I151 in the search box to learn more about \"Sinusitis: Care Instructions. \"  Current as of: March 27, 2018  Content Version: 11.9  © 0819-5054 [employer]. Care instructions adapted under license by [employer] (which disclaims liability or warranty for this information). If you have questions about a medical condition or this instruction, always ask your healthcare professional. [place] [address] any warranty or liability for your use of this information. Cough: Care Instructions  Your Care Instructions    A cough is your body's response to something that bothers your throat or airways. Many things can cause a cough. You might cough because of a cold or the flu, bronchitis, or asthma. Smoking, postnasal drip, allergies, and stomach acid that backs up into your throat also can cause coughs. A cough is a symptom, not a disease. Most coughs stop when the cause, such as a cold, goes away. You can take a few steps at home to cough less and feel better. Follow-up care is a key part of your treatment and safety. Be sure to make and go to all appointments, and call your doctor if you are having problems. It's also a good idea to know your test results and keep a list of the medicines you take. How can you care for yourself at home? · Drink lots of water and other fluids. This helps thin the mucus and soothes a dry or sore throat. Honey or lemon juice in hot water or tea may ease a dry cough. · Take cough medicine as directed by your doctor. · Prop up your head on pillows to help you breathe and ease a dry cough. · Try cough drops to soothe a dry or sore throat. Cough drops don't stop a cough. Medicine-flavored cough drops are no better than candy-flavored drops or hard candy.   · Do not smoke. Avoid secondhand smoke. If you need help quitting, talk to your doctor about stop-smoking programs and medicines. These can increase your chances of quitting for good. When should you call for help? Call 911 anytime you think you may need emergency care. For example, call if:    · You have severe trouble breathing.    Call your doctor now or seek immediate medical care if:    · You cough up blood.     · You have new or worse trouble breathing.     · You have a new or higher fever.     · You have a new rash.    Watch closely for changes in your health, and be sure to contact your doctor if:    · You cough more deeply or more often, especially if you notice more mucus or a change in the color of your mucus.     · You have new symptoms, such as a sore throat, an earache, or sinus pain.     · You do not get better as expected. Where can you learn more? Go to http://orion-tena.info/. Enter D279 in the search box to learn more about \"Cough: Care Instructions. \"  Current as of: September 5, 2018  Content Version: 11.9  © 2569-3770 [employer]. Care instructions adapted under license by [employer] (which disclaims liability or warranty for this information). If you have questions about a medical condition or this instruction, always ask your healthcare professional. [place] [address] any warranty or liability for your use of this information.

## 2019-07-15 NOTE — ED PROVIDER NOTES
EMERGENCY DEPARTMENT HISTORY AND PHYSICAL EXAM    Date: 7/15/2019  Patient Name: Claudetta Morita    History of Presenting Illness     Time Seen:2:35 PM      Chief Complaint   Patient presents with    Ear Pain       History Provided By: Patient    Additional History (Context):   Claudetta Morita is a 44 y.o. female presents the emergency room with complaints of bilateral ear pain left greater than right. Symptoms for the last 3 to 4 days. Also complains of increased nasal congestion and drainage. Sinus pressure and pain. Some postnasal drip. Slight cough. Cough mostly dry. History of asthma. Has been using her albuterol inhaler. Also has been using Claritin and Flonase. However symptoms seem to get worse. No bleeding or drainage from her ears. Afebrile. No chills or achiness. Patient came in because of her worsening symptoms related to her ears. PCP: None    Current Outpatient Medications   Medication Sig Dispense Refill    quetiapine fumarate (SEROQUEL PO) Take  by mouth.  paroxetine HCl (PAXIL PO) Take  by mouth.  trazodone HCl (TRAZODONE, BULK,) by Does Not Apply route.  bupropion HCl (WELLBUTRIN PO) Take  by mouth.  amoxicillin-clavulanate (AUGMENTIN) 875-125 mg per tablet Take 1 Tab by mouth two (2) times a day. 20 Tab 0    albuterol (PROVENTIL HFA, VENTOLIN HFA) 90 mcg/actuation inhaler Take 1 Puff by inhalation every four (4) hours as needed.          Past History     Past Medical History:  Past Medical History:   Diagnosis Date    Asthma     Chronic kidney disease     hx stones    GERD (gastroesophageal reflux disease)     Psychiatric disorder     depression/anxiety    Unspecified adverse effect of anesthesia     14yrs ago states \"heart stopped\" with anest. during c/s in [place]       Past Surgical History:  Past Surgical History:   Procedure Laterality Date    HX GYN      c/s x 2    HX ORTHOPAEDIC      rt foot surgery       Family History:  History reviewed. No pertinent family history. Social History:  Social History     Tobacco Use    Smoking status: Current Some Day Smoker     Packs/day: 1.00    Smokeless tobacco: Never Used    Tobacco comment: smokes when drinking at times   Substance Use Topics    Alcohol use: Yes     Alcohol/week: 3.0 oz     Types: 6 Cans of beer per week     Comment: per week    Drug use: No       Allergies: Allergies   Allergen Reactions    Percocet [Oxycodone-Acetaminophen] Nausea and Vomiting     Pt said she can take percocet.  Vicodin [Hydrocodone-Acetaminophen] Nausea and Vomiting         Review of Systems   Review of Systems   Constitutional: Negative for chills and fever. HENT: Positive for congestion, ear pain, postnasal drip, sinus pressure and sinus pain. Negative for ear discharge, facial swelling, rhinorrhea, sneezing, sore throat and trouble swallowing. Eyes: Negative. Respiratory: Positive for cough and wheezing. Neurological: Positive for headaches. All other systems reviewed and are negative. Physical Exam     Vitals:    07/15/19 1404   BP: 123/62   Pulse: 82   Resp: 16   Temp: 98.6 °F (37 °C)   SpO2: 100%   Weight: 84.4 kg (186 lb)   Height: 5' 2\" (1.575 m)     Physical Exam   Constitutional: She is oriented to person, place, and time. She appears well-developed and well-nourished. She is cooperative. She does not appear ill. No distress. HENT:   Right Ear: Tympanic membrane, external ear and ear canal normal.   Left Ear: Tympanic membrane, external ear and ear canal normal.   Nose: Mucosal edema present. No rhinorrhea. Right sinus exhibits maxillary sinus tenderness and frontal sinus tenderness. Left sinus exhibits maxillary sinus tenderness and frontal sinus tenderness. Mouth/Throat: Oropharynx is clear and moist and mucous membranes are normal.   Eyes: Pupils are equal, round, and reactive to light. Conjunctivae and EOM are normal.   Neck: Neck supple.    Cardiovascular: Normal rate, regular rhythm and normal heart sounds. Pulmonary/Chest: Effort normal and breath sounds normal. No respiratory distress. She has no wheezes. She has no rales. Dry cough noted mostly at the end of expiration. Lymphadenopathy:     She has no cervical adenopathy. Neurological: She is alert and oriented to person, place, and time. Skin: Skin is warm and dry. Psychiatric: She has a normal mood and affect. Nursing note and vitals reviewed. Nursing note and vitals reviewed         Diagnostic Study Results     Labs -   No results found for this or any previous visit (from the past 12 hour(s)). Radiologic Studies   No orders to display     CT Results  (Last 48 hours)    None        CXR Results  (Last 48 hours)    None            Medical Decision Making   I am the first provider for this patient. I reviewed the vital signs, available nursing notes, past medical history, past surgical history, family history and social history. Vital Signs-Reviewed the patient's vital signs. Records Reviewed: Nursing Notes    Provider Notes:   44 y.o. female resents emergency room with complaints of bilateral ear pain left greater than right as well as worsening upper respiratory symptoms. History of asthma. Examination here fairly benign. Suspect more station tube dysfunction related to early sinusitis. She also has a history of asthma which I suspect is why she is coughing. Doubt pneumonia. Patient already taking Claritin and Flonase. Also recommended the use of pseudoephedrine. We will place her on Augmentin to treat early sinusitis. Tylenol/Motrin for headache. Discharged home. Procedures:  Procedures    ED Course:   Initial assessment performed. The patients presenting problems have been discussed, and they are in agreement with the care plan formulated and outlined with them. I have encouraged them to ask questions as they arise throughout their visit.          Diagnosis and Disposition DISCHARGE NOTE:  2:52 PM    Polly Hill's  results have been reviewed with her. She has been counseled regarding her diagnosis, treatment, and plan. She verbally conveys understanding and agreement of the signs, symptoms, diagnosis, treatment and prognosis and additionally agrees to follow up as discussed. She also agrees with the care-plan and conveys that all of her questions have been answered. I have also provided discharge instructions for her that include: educational information regarding their diagnosis and treatment, and list of reasons why they would want to return to the ED prior to their follow-up appointment, should her condition change. She has been provided with education for proper emergency department utilization. CLINICAL IMPRESSION:    1. Acute non-recurrent maxillary sinusitis    2. Eustachian tube dysfunction, bilateral    3. Cough        PLAN:  1. D/C Home  2. Discharge Medication List as of 7/15/2019  2:54 PM      START taking these medications    Details   amoxicillin-clavulanate (AUGMENTIN) 875-125 mg per tablet Take 1 Tab by mouth two (2) times a day., Print, Disp-20 Tab, R-0         CONTINUE these medications which have NOT CHANGED    Details   quetiapine fumarate (SEROQUEL PO) Take  by mouth., Historical Med      paroxetine HCl (PAXIL PO) Take  by mouth., Historical Med      trazodone HCl (TRAZODONE, BULK,) by Does Not Apply route., Historical Med      bupropion HCl (WELLBUTRIN PO) Take  by mouth., Historical Med      albuterol (PROVENTIL HFA, VENTOLIN HFA) 90 mcg/actuation inhaler Take 1 Puff by inhalation every four (4) hours as needed., Historical Med           3.    Follow-up Information     Follow up With Specialties Details Why Contact Info    None   Follow-up with your PCP in 7 to 10 days None ([phone]) Patient stated that they have no PCP      [facility] EMERGENCY DE[facility] Emergency Medicine  If symptoms worsen, As needed [address] [id number]  [phone]        ____________________________________     Please note that this dictation was completed with [employer], the computer voice recognition software. Quite often unanticipated grammatical, syntax, homophones, and other interpretive errors are inadvertently transcribed by the computer software. Please disregard these errors. Please excuse any errors that have escaped final proofreading.

## 2019-09-18 ENCOUNTER — APPOINTMENT (OUTPATIENT)
Dept: CT IMAGING | Age: 44
End: 2019-09-18
Attending: PHYSICIAN ASSISTANT
Payer: MEDICAID

## 2019-09-18 ENCOUNTER — HOSPITAL ENCOUNTER (EMERGENCY)
Age: 44
Discharge: HOME | End: 2019-09-18
Attending: EMERGENCY MEDICINE
Payer: MEDICAID

## 2019-09-18 VITALS
SYSTOLIC BLOOD PRESSURE: 112 MMHG | BODY MASS INDEX: 34.41 KG/M2 | HEIGHT: 62 IN | DIASTOLIC BLOOD PRESSURE: 64 MMHG | OXYGEN SATURATION: 100 % | HEART RATE: 66 BPM | WEIGHT: 187 LBS | TEMPERATURE: 98.2 F | RESPIRATION RATE: 16 BRPM

## 2019-09-18 DIAGNOSIS — R10.30 LOWER ABDOMINAL PAIN: Primary | ICD-10-CM

## 2019-09-18 DIAGNOSIS — K21.9 GASTROESOPHAGEAL REFLUX DISEASE, ESOPHAGITIS PRESENCE NOT SPECIFIED: ICD-10-CM

## 2019-09-18 DIAGNOSIS — K80.20 CALCULUS OF GALLBLADDER WITHOUT CHOLECYSTITIS WITHOUT OBSTRUCTION: ICD-10-CM

## 2019-09-18 DIAGNOSIS — D25.9 UTERINE LEIOMYOMA, UNSPECIFIED LOCATION: ICD-10-CM

## 2019-09-18 DIAGNOSIS — R11.2 NON-INTRACTABLE VOMITING WITH NAUSEA, UNSPECIFIED VOMITING TYPE: ICD-10-CM

## 2019-09-18 LAB
ALBUMIN SERPL-MCNC: 3.4 G/DL (ref 3.4–5)
ALBUMIN/GLOB SERPL: 1 {RATIO} (ref 0.8–1.7)
ALP SERPL-CCNC: 87 U/L (ref 45–117)
ALT SERPL-CCNC: 17 U/L (ref 13–56)
ANION GAP SERPL CALC-SCNC: 3 MMOL/L (ref 3–18)
APPEARANCE UR: CLEAR
AST SERPL-CCNC: 16 U/L (ref 10–38)
BACTERIA URNS QL MICRO: ABNORMAL /HPF
BASOPHILS # BLD: 0 K/UL (ref 0–0.1)
BASOPHILS NFR BLD: 0 % (ref 0–2)
BILIRUB SERPL-MCNC: 0.3 MG/DL (ref 0.2–1)
BILIRUB UR QL: NEGATIVE
BUN SERPL-MCNC: 10 MG/DL (ref 7–18)
BUN/CREAT SERPL: 16 (ref 12–20)
CALCIUM SERPL-MCNC: 8.9 MG/DL (ref 8.5–10.1)
CHLORIDE SERPL-SCNC: 107 MMOL/L (ref 100–111)
CO2 SERPL-SCNC: 26 MMOL/L (ref 21–32)
COLOR UR: YELLOW
CREAT SERPL-MCNC: 0.62 MG/DL (ref 0.6–1.3)
DIFFERENTIAL METHOD BLD: NORMAL
EOSINOPHIL # BLD: 0.2 K/UL (ref 0–0.4)
EOSINOPHIL NFR BLD: 2 % (ref 0–5)
EPITH CASTS URNS QL MICRO: ABNORMAL /LPF (ref 0–5)
ERYTHROCYTE [DISTWIDTH] IN BLOOD BY AUTOMATED COUNT: 13.5 % (ref 11.6–14.5)
GLOBULIN SER CALC-MCNC: 3.3 G/DL (ref 2–4)
GLUCOSE SERPL-MCNC: 75 MG/DL (ref 74–99)
GLUCOSE UR STRIP.AUTO-MCNC: NEGATIVE MG/DL
HCG SERPL QL: NEGATIVE
HCT VFR BLD AUTO: 41.1 % (ref 35–45)
HGB BLD-MCNC: 13.4 G/DL (ref 12–16)
HGB UR QL STRIP: ABNORMAL
KETONES UR QL STRIP.AUTO: NEGATIVE MG/DL
LEUKOCYTE ESTERASE UR QL STRIP.AUTO: NEGATIVE
LIPASE SERPL-CCNC: 71 U/L (ref 73–393)
LYMPHOCYTES # BLD: 2.3 K/UL (ref 0.9–3.6)
LYMPHOCYTES NFR BLD: 22 % (ref 21–52)
MCH RBC QN AUTO: 29 PG (ref 24–34)
MCHC RBC AUTO-ENTMCNC: 32.6 G/DL (ref 31–37)
MCV RBC AUTO: 89 FL (ref 74–97)
MONOCYTES # BLD: 0.8 K/UL (ref 0.05–1.2)
MONOCYTES NFR BLD: 7 % (ref 3–10)
NEUTS SEG # BLD: 7.1 K/UL (ref 1.8–8)
NEUTS SEG NFR BLD: 69 % (ref 40–73)
NITRITE UR QL STRIP.AUTO: NEGATIVE
PH UR STRIP: 5 [PH] (ref 5–8)
PLATELET # BLD AUTO: 317 K/UL (ref 135–420)
PMV BLD AUTO: 11.4 FL (ref 9.2–11.8)
POTASSIUM SERPL-SCNC: 4.6 MMOL/L (ref 3.5–5.5)
PROT SERPL-MCNC: 6.7 G/DL (ref 6.4–8.2)
PROT UR STRIP-MCNC: NEGATIVE MG/DL
RBC # BLD AUTO: 4.62 M/UL (ref 4.2–5.3)
RBC #/AREA URNS HPF: ABNORMAL /HPF (ref 0–5)
SODIUM SERPL-SCNC: 136 MMOL/L (ref 136–145)
SP GR UR REFRACTOMETRY: 1.02 (ref 1–1.03)
UROBILINOGEN UR QL STRIP.AUTO: 0.2 EU/DL (ref 0.2–1)
WBC # BLD AUTO: 10.3 K/UL (ref 4.6–13.2)
WBC URNS QL MICRO: ABNORMAL /HPF (ref 0–5)

## 2019-09-18 PROCEDURE — 81001 URINALYSIS AUTO W/SCOPE: CPT

## 2019-09-18 PROCEDURE — 83690 ASSAY OF LIPASE: CPT

## 2019-09-18 PROCEDURE — 80053 COMPREHEN METABOLIC PANEL: CPT

## 2019-09-18 PROCEDURE — 99282 EMERGENCY DEPT VISIT SF MDM: CPT

## 2019-09-18 PROCEDURE — 96376 TX/PRO/DX INJ SAME DRUG ADON: CPT

## 2019-09-18 PROCEDURE — 74176 CT ABD & PELVIS W/O CONTRAST: CPT

## 2019-09-18 PROCEDURE — 84703 CHORIONIC GONADOTROPIN ASSAY: CPT

## 2019-09-18 PROCEDURE — 96372 THER/PROPH/DIAG INJ SC/IM: CPT

## 2019-09-18 PROCEDURE — 96365 THER/PROPH/DIAG IV INF INIT: CPT

## 2019-09-18 PROCEDURE — 74011250636 HC RX REV CODE- 250/636: Performed by: PHYSICIAN ASSISTANT

## 2019-09-18 PROCEDURE — 87086 URINE CULTURE/COLONY COUNT: CPT

## 2019-09-18 PROCEDURE — 96375 TX/PRO/DX INJ NEW DRUG ADDON: CPT

## 2019-09-18 PROCEDURE — 85025 COMPLETE CBC W/AUTO DIFF WBC: CPT

## 2019-09-18 RX ORDER — DICYCLOMINE HYDROCHLORIDE 10 MG/ML
20 INJECTION INTRAMUSCULAR ONCE
Status: COMPLETED | OUTPATIENT
Start: 2019-09-18 | End: 2019-09-18

## 2019-09-18 RX ORDER — ACETAMINOPHEN 10 MG/ML
1000 INJECTION, SOLUTION INTRAVENOUS ONCE
Status: COMPLETED | OUTPATIENT
Start: 2019-09-18 | End: 2019-09-18

## 2019-09-18 RX ORDER — OMEPRAZOLE 20 MG/1
20 CAPSULE, DELAYED RELEASE ORAL DAILY
Qty: 30 CAP | Refills: 0 | Status: SHIPPED | OUTPATIENT
Start: 2019-09-18 | End: 2021-02-15

## 2019-09-18 RX ORDER — ETODOLAC 400 MG/1
400 TABLET, FILM COATED ORAL 2 TIMES DAILY WITH MEALS
Qty: 20 TAB | Refills: 0 | Status: SHIPPED | OUTPATIENT
Start: 2019-09-18 | End: 2021-02-15

## 2019-09-18 RX ORDER — ONDANSETRON 2 MG/ML
4 INJECTION INTRAMUSCULAR; INTRAVENOUS
Status: COMPLETED | OUTPATIENT
Start: 2019-09-18 | End: 2019-09-18

## 2019-09-18 RX ORDER — ONDANSETRON 4 MG/1
4 TABLET, ORALLY DISINTEGRATING ORAL
Qty: 15 TAB | Refills: 0 | OUTPATIENT
Start: 2019-09-18 | End: 2021-02-05

## 2019-09-18 RX ORDER — MORPHINE SULFATE 4 MG/ML
4 INJECTION INTRAVENOUS
Status: COMPLETED | OUTPATIENT
Start: 2019-09-18 | End: 2019-09-18

## 2019-09-18 RX ADMIN — ONDANSETRON 4 MG: 2 INJECTION INTRAMUSCULAR; INTRAVENOUS at 17:11

## 2019-09-18 RX ADMIN — ONDANSETRON 4 MG: 2 INJECTION INTRAMUSCULAR; INTRAVENOUS at 16:00

## 2019-09-18 RX ADMIN — ACETAMINOPHEN 1000 MG: 10 INJECTION, SOLUTION INTRAVENOUS at 17:02

## 2019-09-18 RX ADMIN — MORPHINE SULFATE 4 MG: 4 INJECTION INTRAVENOUS at 17:11

## 2019-09-18 RX ADMIN — SODIUM CHLORIDE 1000 ML: 900 INJECTION, SOLUTION INTRAVENOUS at 16:00

## 2019-09-18 RX ADMIN — DICYCLOMINE HYDROCHLORIDE 20 MG: 20 INJECTION, SOLUTION INTRAMUSCULAR at 16:00

## 2019-09-18 NOTE — ED TRIAGE NOTES
Patient ambulatory to ED with C/O  lower abdominal pain with nausea and vomiting that started 2 days ago . Patient has a HX of kidney stones and states \"this feels the same\" .

## 2019-09-18 NOTE — ED PROVIDER NOTES
EMERGENCY DEPARTMENT HISTORY AND PHYSICAL EXAM    Date: 9/18/2019  Patient Name: Claudetta Morita    History of Presenting Illness     Chief Complaint   Patient presents with    Abdominal Pain         History Provided By: Patient    Chief Complaint: abdominal pain    HPI(Context):   3:07 PM  Claudetta Morita is a 44 y.o. female with PMHX of renal stones, depression, asthma who presents to the emergency department C/O abdominal pain. Pain from R flank radiating to right abdominal pain. Associated sxs include nausea and vomiting. Sxs x 2 days. Pt denies fever, chills, dysuria, hematuria, and any other sxs or complaints. Pt has hx of stones and this feels similar. LMP 5 days ago. Pt is active smoker. PCP: Josesito, MD Patricia    Current Outpatient Medications   Medication Sig Dispense Refill    omeprazole (PRILOSEC) 20 mg capsule Take 1 Cap by mouth daily. Indications: gastroesophageal reflux disease 30 Cap 0    ondansetron (ZOFRAN ODT) 4 mg disintegrating tablet Take 1 Tab by mouth every eight (8) hours as needed for Nausea. 15 Tab 0    etodolac (LODINE) 400 mg tablet Take 400 mg by mouth two (2) times daily (with meals). Indications: pain 20 Tab 0    bupropion HCl (WELLBUTRIN PO) Take  by mouth.  albuterol (PROVENTIL HFA, VENTOLIN HFA) 90 mcg/actuation inhaler Take 1 Puff by inhalation every four (4) hours as needed.  quetiapine fumarate (SEROQUEL PO) Take  by mouth.  paroxetine HCl (PAXIL PO) Take  by mouth.  trazodone HCl (TRAZODONE, BULK,) by Does Not Apply route.  amoxicillin-clavulanate (AUGMENTIN) 875-125 mg per tablet Take 1 Tab by mouth two (2) times a day.  20 Tab 0       Past History     Past Medical History:  Past Medical History:   Diagnosis Date    Asthma     Chronic kidney disease     hx stones    GERD (gastroesophageal reflux disease)     Psychiatric disorder     depression/anxiety    Unspecified adverse effect of anesthesia     14yrs ago states \"heart stopped\" with anest. during c/s in [place]       Past Surgical History:  Past Surgical History:   Procedure Laterality Date    HX GYN      c/s x 2    HX ORTHOPAEDIC      rt foot surgery       Family History:  History reviewed. No pertinent family history. Social History:  Social History     Tobacco Use    Smoking status: Current Some Day Smoker     Packs/day: 1.00    Smokeless tobacco: Never Used    Tobacco comment: smokes when drinking at times   Substance Use Topics    Alcohol use: Yes     Alcohol/week: 5.0 standard drinks     Types: 6 Cans of beer per week     Comment: per week    Drug use: No       Allergies: Allergies   Allergen Reactions    Percocet [Oxycodone-Acetaminophen] Nausea and Vomiting     Pt said she can take percocet.  Vicodin [Hydrocodone-Acetaminophen] Nausea and Vomiting         Review of Systems   Review of Systems   Constitutional: Negative for chills and fever. Gastrointestinal: Positive for abdominal pain, nausea and vomiting. Genitourinary: Positive for flank pain (right sided). Negative for dysuria, hematuria, vaginal bleeding and vaginal discharge. Musculoskeletal: Positive for back pain. Neurological: Negative for dizziness. All other systems reviewed and are negative. Physical Exam     Vitals:    09/18/19 1507   BP: 123/78   Pulse: 66   Resp: 16   Temp: 98.2 °F (36.8 °C)   SpO2: 100%   Weight: 84.8 kg (187 lb)   Height: 5' 2\" (1.575 m)     Physical Exam   Constitutional: She is oriented to person, place, and time. She appears well-developed and well-nourished. No distress.  female in NAD. Alert. Appears uncomfortable. HENT:   Head: Normocephalic and atraumatic. Right Ear: External ear normal.   Left Ear: External ear normal.   Nose: Nose normal.   Eyes: Conjunctivae are normal.   Neck: Normal range of motion. Cardiovascular: Normal rate, regular rhythm, normal heart sounds and intact distal pulses. Exam reveals no gallop and no friction rub.    No murmur heard. Pulmonary/Chest: Effort normal and breath sounds normal. No accessory muscle usage. No tachypnea. No respiratory distress. She has no decreased breath sounds. She has no wheezes. She has no rhonchi. She has no rales. Abdominal: Soft. She exhibits no distension, no ascites and no mass. There is tenderness in the right lower quadrant. There is CVA tenderness (mild right CVA tenderness). There is no rigidity, no rebound, no guarding and no tenderness at McBurney's point. Musculoskeletal: Normal range of motion. Neurological: She is alert and oriented to person, place, and time. Skin: Skin is warm and dry. No rash noted. She is not diaphoretic. No erythema. Psychiatric: She has a normal mood and affect. Judgment normal.   Nursing note and vitals reviewed.           Diagnostic Study Results     Labs -     Recent Results (from the past 12 hour(s))   URINALYSIS W/ RFLX MICROSCOPIC    Collection Time: 09/18/19  3:14 PM   Result Value Ref Range    Color YELLOW      Appearance CLEAR      Specific gravity 1.024 1.005 - 1.030      pH (UA) 5.0 5.0 - 8.0      Protein NEGATIVE  NEG mg/dL    Glucose NEGATIVE  NEG mg/dL    Ketone NEGATIVE  NEG mg/dL    Bilirubin NEGATIVE  NEG      Blood TRACE (A) NEG      Urobilinogen 0.2 0.2 - 1.0 EU/dL    Nitrites NEGATIVE  NEG      Leukocyte Esterase NEGATIVE  NEG     URINE MICROSCOPIC ONLY    Collection Time: 09/18/19  3:14 PM   Result Value Ref Range    WBC 4 to 10 0 - 5 /hpf    RBC 0 to 3 0 - 5 /hpf    Epithelial cells 2+ 0 - 5 /lpf    Bacteria FEW (A) NEG /hpf   HCG QL SERUM    Collection Time: 09/18/19  3:45 PM   Result Value Ref Range    HCG, Ql. NEGATIVE  NEG     CBC WITH AUTOMATED DIFF    Collection Time: 09/18/19  3:45 PM   Result Value Ref Range    WBC 10.3 4.6 - 13.2 K/uL    RBC 4.62 4.20 - 5.30 M/uL    HGB 13.4 12.0 - 16.0 g/dL    HCT 41.1 35.0 - 45.0 %    MCV 89.0 74.0 - 97.0 FL    MCH 29.0 24.0 - 34.0 PG    MCHC 32.6 31.0 - 37.0 g/dL    RDW 13.5 11.6 - 14.5 % PLATELET 361 281 - 123 K/uL    MPV 11.4 9.2 - 11.8 FL    NEUTROPHILS 69 40 - 73 %    LYMPHOCYTES 22 21 - 52 %    MONOCYTES 7 3 - 10 %    EOSINOPHILS 2 0 - 5 %    BASOPHILS 0 0 - 2 %    ABS. NEUTROPHILS 7.1 1.8 - 8.0 K/UL    ABS. LYMPHOCYTES 2.3 0.9 - 3.6 K/UL    ABS. MONOCYTES 0.8 0.05 - 1.2 K/UL    ABS. EOSINOPHILS 0.2 0.0 - 0.4 K/UL    ABS. BASOPHILS 0.0 0.0 - 0.1 K/UL    DF AUTOMATED     METABOLIC PANEL, COMPREHENSIVE    Collection Time: 09/18/19  3:45 PM   Result Value Ref Range    Sodium 136 136 - 145 mmol/L    Potassium 4.6 3.5 - 5.5 mmol/L    Chloride 107 100 - 111 mmol/L    CO2 26 21 - 32 mmol/L    Anion gap 3 3.0 - 18 mmol/L    Glucose 75 74 - 99 mg/dL    BUN 10 7.0 - 18 MG/DL    Creatinine 0.62 0.6 - 1.3 MG/DL    BUN/Creatinine ratio 16 12 - 20      GFR est AA >60 >60 ml/min/1.73m2    GFR est non-AA >60 >60 ml/min/1.73m2    Calcium 8.9 8.5 - 10.1 MG/DL    Bilirubin, total 0.3 0.2 - 1.0 MG/DL    ALT (SGPT) 17 13 - 56 U/L    AST (SGOT) 16 10 - 38 U/L    Alk. phosphatase 87 45 - 117 U/L    Protein, total 6.7 6.4 - 8.2 g/dL    Albumin 3.4 3.4 - 5.0 g/dL    Globulin 3.3 2.0 - 4.0 g/dL    A-G Ratio 1.0 0.8 - 1.7     LIPASE    Collection Time: 09/18/19  3:45 PM   Result Value Ref Range    Lipase 71 (L) 73 - 393 U/L       CT ABD PELV WO CONT   Final Result   IMPRESSION:      1. No evidence of hydronephrosis or urolithiasis. 2. Normal caliber small and large bowel to include a normal appendix. 3. Small hiatal hernia. 4. Likely uterine fibroids. 5. Cholelithiasis           CT Results  (Last 48 hours)               09/18/19 1658  CT ABD PELV WO CONT Final result    Impression:  IMPRESSION:       1. No evidence of hydronephrosis or urolithiasis. 2. Normal caliber small and large bowel to include a normal appendix. 3. Small hiatal hernia. 4. Likely uterine fibroids.        5. Cholelithiasis           Narrative:  EXAM: CT of the abdomen and pelvis       INDICATION: Lower abdominal pain with nausea and vomiting. COMPARISON: None. TECHNIQUE: Axial CT imaging of the abdomen and pelvis was performed without oral   or intravenous contrast. Multiplanar reformats were generated. One or more dose   reduction techniques were used on this CT: automated exposure control,   adjustment of the mAs and/or kVp according to patient size, and iterative   reconstruction techniques. The specific techniques used on this CT exam have   been documented in the patient's electronic medical record. Digital Imaging and   Communications in Medicine (DICOM) format image data are available to   nonaffiliated external healthcare facilities or entities on a secure, media   free, reciprocally searchable basis with patient authorization for at least a   12-month period after this study. _______________       FINDINGS:       LOWER CHEST: Unremarkable. LIVER, BILIARY: Liver is normal. No biliary dilation. Small gallstones present   within a decompressed gallbladder. No pericholecystic fluid or fat stranding   demonstrated. PANCREAS: Normal.       SPLEEN: Normal.       ADRENALS: Normal.       KIDNEYS/URETERS/BLADDER: No hydronephrosis. No urolithiasis. Urinary bladder   decompressed. PELVIC ORGANS: Slightly lobular uterine contour noted, likely uterine   leiomyomata. Adnexa are unremarkable in appearance. VASCULATURE: Unremarkable       LYMPH NODES: No enlarged lymph nodes. GASTROINTESTINAL TRACT: No bowel dilation or wall thickening. Normal appendix. Small hiatal hernia. BONES: No acute or aggressive osseous abnormalities identified.        OTHER: None.       _______________               CXR Results  (Last 48 hours)    None          Medications given in the ED-  Medications   sodium chloride 0.9 % bolus infusion 1,000 mL (0 mL IntraVENous IV Completed 9/18/19 1702)   ondansetron (ZOFRAN) injection 4 mg (4 mg IntraVENous Given 9/18/19 1600)   dicyclomine (BENTYL) 10 mg/mL injection 20 mg (20 mg IntraMUSCular Given 9/18/19 1600)   acetaminophen (OFIRMEV) infusion 1,000 mg (1,000 mg IntraVENous New Bag 9/18/19 1702)   morphine injection 4 mg (4 mg IntraVENous Given 9/18/19 1711)   ondansetron (ZOFRAN) injection 4 mg (4 mg IntraVENous Given 9/18/19 1711)         Medical Decision Making   I am the first provider for this patient. I reviewed the vital signs, available nursing notes, past medical history, past surgical history, family history and social history. Vital Signs-Reviewed the patient's vital signs. Pulse Oximetry Analysis - 100% on RA     Records Reviewed: Nursing Notes    Provider Notes (Medical Decision Making): stone, UTI/pyelo, renal infarct, diverticulitis, appy. Doubt dissection    Procedures:  Procedures    ED Course:   3:07 PM Initial assessment performed. The patients presenting problems have been discussed, and they are in agreement with the care plan formulated and outlined with them. I have encouraged them to ask questions as they arise throughout their visit. Diagnosis and Disposition       4:53 PM  Patient's presentation, labs/imaging and plan of care was reviewed with Leonardo Arora PA-C as part of sign out. They will FU on CT and dispo patient as part of the plan discussed with the patient. Kareem Adams's assistance in completion of this plan is greatly appreciated but it should be noted that I will be the provider of record for this patient. Written by Jose Miguel Schrader PA-C    5:39 PM  Patient CT scan results were resulted by radiology. There is no evidence of any kidney stone which was the biggest concern regarding patient due to her previous history. CT scan did show small hiatal hernia, cholelithiasis without cholecystitis, uterine fibroids. Patient was advised of these results. Spoke with her about her various issues. We will have her follow back up with her PCP.   However also told her to follow-up with her OB/GYN physician regarding her uterine fibroids. Evidently she has an upcoming appointment with her OB/GYN doctor to discuss possible further surgery for hysterectomy. Patient also with gallstones. Does not show any evidence of acute cholecystitis. Will refer her to general surgery to discuss any possible intervention. Patient will be discharged home. 1. Lower abdominal pain    2. Non-intractable vomiting with nausea, unspecified vomiting type    3. Gastroesophageal reflux disease, esophagitis presence not specified    4. Calculus of gallbladder without cholecystitis without obstruction    5. Uterine leiomyoma, unspecified location        PLAN:  1. D/C Home  2. Current Discharge Medication List      START taking these medications    Details   omeprazole (PRILOSEC) 20 mg capsule Take 1 Cap by mouth daily. Indications: gastroesophageal reflux disease  Qty: 30 Cap, Refills: 0      ondansetron (ZOFRAN ODT) 4 mg disintegrating tablet Take 1 Tab by mouth every eight (8) hours as needed for Nausea. Qty: 15 Tab, Refills: 0    Associated Diagnoses: Non-intractable vomiting with nausea, unspecified vomiting type      etodolac (LODINE) 400 mg tablet Take 400 mg by mouth two (2) times daily (with meals). Indications: pain  Qty: 20 Tab, Refills: 0           3.    Follow-up Information     Follow up With Specialties Details Why Contact Info    PCP  Call in 1 day Call for follow-up     Doe Oakley DO Colon and Rectal Surgery Call Call to make a follow-up appointment with general surgery to discuss gallstones [address]      Emely Bailey MD Obstetrics & Gynecology, Gynecology, Obstetrics Call Call to make follow-up regarding lower abdominal pain and uterine fibroids [facility] [id number] [place] [address] EMERGENCY DEPT Emergency Medicine  If symptoms worsen, As needed 2 [address]anca Peterson [facility] [address] [phone] _______________________________    Attestations: This note is prepared by Umer Zayas PA-C.  _______________________________          Please note that this dictation was completed with [employer], the computer voice recognition software. Quite often unanticipated grammatical, syntax, homophones, and other interpretive errors are inadvertently transcribed by the computer software. Please disregard these errors. Please excuse any errors that have escaped final proofreading.

## 2019-09-18 NOTE — DISCHARGE INSTRUCTIONS
Medication as prescribed  Recommend follow-up with your PCP  We will give referral to general surgery regarding gallstones  Would recommend following up with OB/GYN regarding uterine fibroids and lower abdominal pain       Biliary Colic: Care Instructions  Your Care Instructions    Biliary (say \"BILL-ee-air-ee\") colic is belly pain caused by gallbladder problems. It is usually caused by a gallstone moving through or blocking the common bile duct or cystic duct. Gallstones are stones that form in the gallbladder. They are made of cholesterol and other substances. The gallbladder is a small sac located just under the liver. It stores bile released by the liver. Bile helps you digest fats. Gallstones also can form in the common bile duct or cystic duct. These ducts carry bile from the gallbladder and the liver to the small intestine. Gallstones may be as small as a grain of sand or as large as a golf ball. Gallstones that cause severe symptoms usually are treated with surgery to remove the gallbladder. If the first attack of biliary colic is mild, it is often safe to wait until you have had another attack before you think about having surgery. The doctor has checked you carefully, but problems can develop later. If you notice any problems or new symptoms, get medical treatment right away. Follow-up care is a key part of your treatment and safety. Be sure to make and go to all appointments, and call your doctor if you are having problems. It's also a good idea to know your test results and keep a list of the medicines you take. How can you care for yourself at home? · Take pain medicines exactly as directed. ? If the doctor gave you a prescription medicine for pain, take it as prescribed. ? If you are not taking a prescription pain medicine, ask your doctor if you can take an over-the-counter medicine. Read and follow all instructions on the label. · Avoid foods that cause symptoms, especially fatty foods. These can cause biliary colic. · You may need more tests to look at your gallbladder. When should you call for help? Call your doctor now or seek immediate medical care if:    · You have a fever.     · You have new belly pain, or your pain gets worse.     · There is a new or increasing yellow tint to your skin or the whites of your eyes.     · Your urine is dark yellow-brown, or your stools are light-colored or white.     · You cannot keep down fluids.    Watch closely for changes in your health, and be sure to contact your doctor if:    · You do not get better as expected.     · You are not getting better after 1 day (24 hours). Where can you learn more? Go to http://orion-tena.info/. Enter U191 in the search box to learn more about \"Biliary Colic: Care Instructions. \"  Current as of: November 7, 2018  Content Version: 12.1  © 9299-6879 [employer]. Care instructions adapted under license by [employer] (which disclaims liability or warranty for this information). If you have questions about a medical condition or this instruction, always ask your healthcare professional. [place] [address] any warranty or liability for your use of this information. Nausea and Vomiting: Care Instructions  Your Care Instructions    When you are nauseated, you may feel weak and sweaty and notice a lot of saliva in your mouth. Nausea often leads to vomiting. Most of the time you do not need to worry about nausea and vomiting, but they can be signs of other illnesses. Two common causes of nausea and vomiting are stomach flu and food poisoning. Nausea and vomiting from viral stomach flu will usually start to improve within 24 hours. Nausea and vomiting from food poisoning may last from 12 to 48 hours. The doctor has checked you carefully, but problems can develop later. If you notice any problems or new symptoms, get medical treatment right away.   Follow-up care is a key part of your treatment and safety. Be sure to make and go to all appointments, and call your doctor if you are having problems. It's also a good idea to know your test results and keep a list of the medicines you take. How can you care for yourself at home? · To prevent dehydration, drink plenty of fluids, enough so that your urine is light yellow or clear like water. Choose water and other caffeine-free clear liquids until you feel better. If you have kidney, heart, or liver disease and have to limit fluids, talk with your doctor before you increase the amount of fluids you drink. · Rest in bed until you feel better. · When you are able to eat, try clear soups, mild foods, and liquids until all symptoms are gone for 12 to 48 hours. Other good choices include dry toast, crackers, cooked cereal, and gelatin dessert, such as Jell-O. When should you call for help? Call 911 anytime you think you may need emergency care. For example, call if:    · You passed out (lost consciousness).    Call your doctor now or seek immediate medical care if:    · You have symptoms of dehydration, such as:  ? Dry eyes and a dry mouth. ? Passing only a little dark urine. ? Feeling thirstier than usual.     · You have new or worsening belly pain.     · You have a new or higher fever.     · You vomit blood or what looks like coffee grounds.    Watch closely for changes in your health, and be sure to contact your doctor if:    · You have ongoing nausea and vomiting.     · Your vomiting is getting worse.     · Your vomiting lasts longer than 2 days.     · You are not getting better as expected. Where can you learn more? Go to http://orion-tena.info/. Enter 25 [id number] in the search box to learn more about \"Nausea and Vomiting: Care Instructions. \"  Current as of: September 23, 2018  Content Version: 12.1  © 4351-0855 [employer].  Care instructions adapted under license by [facility] (which disclaims liability or warranty for this information). If you have questions about a medical condition or this instruction, always ask your healthcare professional. [address] any warranty or liability for your use of this information. Gastroesophageal Reflux Disease (GERD): Care Instructions  Your Care Instructions    Gastroesophageal reflux disease (GERD) is the backward flow of stomach acid into the esophagus. The esophagus is the tube that leads from your throat to your stomach. A one-way valve prevents the stomach acid from moving up into this tube. When you have GERD, this valve does not close tightly enough. If you have mild GERD symptoms including heartburn, you may be able to control the problem with antacids or over-the-counter medicine. Changing your diet, losing weight, and making other lifestyle changes can also help reduce symptoms. Follow-up care is a key part of your treatment and safety. Be sure to make and go to all appointments, and call your doctor if you are having problems. It's also a good idea to know your test results and keep a list of the medicines you take. How can you care for yourself at home? · Take your medicines exactly as prescribed. Call your doctor if you think you are having a problem with your medicine. · Your doctor may recommend over-the-counter medicine. For mild or occasional indigestion, antacids, such as Tums, Gaviscon, Mylanta, or Maalox, may help. Your doctor also may recommend over-the-counter acid reducers, such as Pepcid AC, Tagamet HB, Zantac 75, or Prilosec. Read and follow all instructions on the label. If you use these medicines often, talk with your doctor. · Change your eating habits. ? It's best to eat several small meals instead of two or three large meals. ? After you eat, wait 2 to 3 hours before you lie down. ? Chocolate, mint, and alcohol can make GERD worse. ?  Spicy foods, foods that have a lot of acid (like tomatoes and oranges), and coffee can make GERD symptoms worse in some people. If your symptoms are worse after you eat a certain food, you may want to stop eating that food to see if your symptoms get better. · Do not smoke or chew tobacco. Smoking can make GERD worse. If you need help quitting, talk to your doctor about stop-smoking programs and medicines. These can increase your chances of quitting for good. · If you have GERD symptoms at night, raise the head of your bed 6 to 8 inches by putting the frame on blocks or placing a foam wedge under the head of your mattress. (Adding extra pillows does not work.)  · Do not wear tight clothing around your middle. · Lose weight if you need to. Losing just 5 to 10 pounds can help. When should you call for help? Call your doctor now or seek immediate medical care if:    · You have new or different belly pain.     · Your stools are black and tarlike or have streaks of blood.    Watch closely for changes in your health, and be sure to contact your doctor if:    · Your symptoms have not improved after 2 days.     · Food seems to catch in your throat or chest.   Where can you learn more? Go to http://orion-tena.info/. Enter X568 in the search box to learn more about \"Gastroesophageal Reflux Disease (GERD): Care Instructions. \"  Current as of: November 7, 2018  Content Version: 12.1  © 9561-5846 [employer]. Care instructions adapted under license by [employer] (which disclaims liability or warranty for this information). If you have questions about a medical condition or this instruction, always ask your healthcare professional. [address] any warranty or liability for your use of this information. Abdominal Pain: Care Instructions  Your Care Instructions    Abdominal pain has many possible causes. Some aren't serious and get better on their own in a few days.  Others need more testing and treatment. If your pain continues or gets worse, you need to be rechecked and may need more tests to find out what is wrong. You may need surgery to correct the problem. Don't ignore new symptoms, such as fever, nausea and vomiting, urination problems, pain that gets worse, and dizziness. These may be signs of a more serious problem. Your doctor may have recommended a follow-up visit in the next 8 to 12 hours. If you are not getting better, you may need more tests or treatment. The doctor has checked you carefully, but problems can develop later. If you notice any problems or new symptoms, get medical treatment right away. Follow-up care is a key part of your treatment and safety. Be sure to make and go to all appointments, and call your doctor if you are having problems. It's also a good idea to know your test results and keep a list of the medicines you take. How can you care for yourself at home? · Rest until you feel better. · To prevent dehydration, drink plenty of fluids, enough so that your urine is light yellow or clear like water. Choose water and other caffeine-free clear liquids until you feel better. If you have kidney, heart, or liver disease and have to limit fluids, talk with your doctor before you increase the amount of fluids you drink. · If your stomach is upset, eat mild foods, such as rice, dry toast or crackers, bananas, and applesauce. Try eating several small meals instead of two or three large ones. · Wait until 48 hours after all symptoms have gone away before you have spicy foods, alcohol, and drinks that contain caffeine. · Do not eat foods that are high in fat. · Avoid anti-inflammatory medicines such as aspirin, ibuprofen (Advil, Motrin), and naproxen (Aleve). These can cause stomach upset. Talk to your doctor if you take daily aspirin for another health problem. When should you call for help? Call 911 anytime you think you may need emergency care.  For example, call if:    · You passed out (lost consciousness).     · You pass maroon or very bloody stools.     · You vomit blood or what looks like coffee grounds.     · You have new, severe belly pain.    Call your doctor now or seek immediate medical care if:    · Your pain gets worse, especially if it becomes focused in one area of your belly.     · You have a new or higher fever.     · Your stools are black and look like tar, or they have streaks of blood.     · You have unexpected vaginal bleeding.     · You have symptoms of a urinary tract infection. These may include:  ? Pain when you urinate. ? Urinating more often than usual.  ? Blood in your urine.     · You are dizzy or lightheaded, or you feel like you may faint.    Watch closely for changes in your health, and be sure to contact your doctor if:    · You are not getting better after 1 day (24 hours). Where can you learn more? Go to http://orion-tena.info/. Enter J728 in the search box to learn more about \"Abdominal Pain: Care Instructions. \"  Current as of: September 23, 2018  Content Version: 12.1  © 1135-4841 [employer]. Care instructions adapted under license by [employer] (which disclaims liability or warranty for this information). If you have questions about a medical condition or this instruction, always ask your healthcare professional. [address] any warranty or liability for your use of this information.

## 2019-09-20 LAB
BACTERIA SPEC CULT: NORMAL
SERVICE CMNT-IMP: NORMAL

## 2019-09-23 ENCOUNTER — HOSPITAL ENCOUNTER (OUTPATIENT)
Dept: LAB | Age: 44
Discharge: HOME | End: 2019-09-23

## 2019-09-23 LAB — SENTARA SPECIMEN COL,SENBCF: NORMAL

## 2019-09-23 PROCEDURE — 99001 SPECIMEN HANDLING PT-LAB: CPT

## 2021-01-26 ENCOUNTER — HOSPITAL ENCOUNTER (EMERGENCY)
Age: 46
Discharge: HOME | End: 2021-01-26
Attending: EMERGENCY MEDICINE | Admitting: EMERGENCY MEDICINE
Payer: COMMERCIAL

## 2021-01-26 ENCOUNTER — APPOINTMENT (OUTPATIENT)
Dept: ULTRASOUND IMAGING | Age: 46
End: 2021-01-26
Attending: EMERGENCY MEDICINE
Payer: COMMERCIAL

## 2021-01-26 ENCOUNTER — APPOINTMENT (OUTPATIENT)
Dept: CT IMAGING | Age: 46
End: 2021-01-26
Attending: EMERGENCY MEDICINE
Payer: COMMERCIAL

## 2021-01-26 VITALS
HEART RATE: 81 BPM | SYSTOLIC BLOOD PRESSURE: 121 MMHG | OXYGEN SATURATION: 100 % | DIASTOLIC BLOOD PRESSURE: 63 MMHG | WEIGHT: 185 LBS | HEIGHT: 62 IN | RESPIRATION RATE: 16 BRPM | TEMPERATURE: 97.5 F | BODY MASS INDEX: 34.04 KG/M2

## 2021-01-26 DIAGNOSIS — K82.9 GALL BLADDER DISEASE: Primary | ICD-10-CM

## 2021-01-26 LAB
ALBUMIN SERPL-MCNC: 3.8 G/DL (ref 3.4–5)
ALBUMIN/GLOB SERPL: 1.2 {RATIO} (ref 0.8–1.7)
ALP SERPL-CCNC: 93 U/L (ref 45–117)
ALT SERPL-CCNC: 30 U/L (ref 13–56)
ANION GAP SERPL CALC-SCNC: 3 MMOL/L (ref 3–18)
APPEARANCE UR: CLEAR
AST SERPL-CCNC: 11 U/L (ref 10–38)
BASOPHILS # BLD: 0 K/UL (ref 0–0.1)
BASOPHILS NFR BLD: 0 % (ref 0–2)
BILIRUB SERPL-MCNC: 0.3 MG/DL (ref 0.2–1)
BILIRUB UR QL: NEGATIVE
BUN SERPL-MCNC: 13 MG/DL (ref 7–18)
BUN/CREAT SERPL: 20 (ref 12–20)
CALCIUM SERPL-MCNC: 9.6 MG/DL (ref 8.5–10.1)
CHLORIDE SERPL-SCNC: 103 MMOL/L (ref 100–111)
CO2 SERPL-SCNC: 31 MMOL/L (ref 21–32)
COLOR UR: YELLOW
CREAT SERPL-MCNC: 0.64 MG/DL (ref 0.6–1.3)
DIFFERENTIAL METHOD BLD: ABNORMAL
EOSINOPHIL # BLD: 0.2 K/UL (ref 0–0.4)
EOSINOPHIL NFR BLD: 2 % (ref 0–5)
ERYTHROCYTE [DISTWIDTH] IN BLOOD BY AUTOMATED COUNT: 13.8 % (ref 11.6–14.5)
GLOBULIN SER CALC-MCNC: 3.3 G/DL (ref 2–4)
GLUCOSE SERPL-MCNC: 77 MG/DL (ref 74–99)
GLUCOSE UR STRIP.AUTO-MCNC: NEGATIVE MG/DL
HCG UR QL: NEGATIVE
HCT VFR BLD AUTO: 43 % (ref 35–45)
HGB BLD-MCNC: 14 G/DL (ref 12–16)
HGB UR QL STRIP: NEGATIVE
KETONES UR QL STRIP.AUTO: 40 MG/DL
LEUKOCYTE ESTERASE UR QL STRIP.AUTO: NEGATIVE
LIPASE SERPL-CCNC: 66 U/L (ref 73–393)
LYMPHOCYTES # BLD: 1.7 K/UL (ref 0.9–3.6)
LYMPHOCYTES NFR BLD: 17 % (ref 21–52)
MCH RBC QN AUTO: 29.7 PG (ref 24–34)
MCHC RBC AUTO-ENTMCNC: 32.6 G/DL (ref 31–37)
MCV RBC AUTO: 91.3 FL (ref 74–97)
MONOCYTES # BLD: 0.8 K/UL (ref 0.05–1.2)
MONOCYTES NFR BLD: 7 % (ref 3–10)
NEUTS SEG # BLD: 7.5 K/UL (ref 1.8–8)
NEUTS SEG NFR BLD: 74 % (ref 40–73)
NITRITE UR QL STRIP.AUTO: NEGATIVE
PH UR STRIP: 6.5 [PH] (ref 5–8)
PLATELET # BLD AUTO: 308 K/UL (ref 135–420)
PMV BLD AUTO: 11.4 FL (ref 9.2–11.8)
POTASSIUM SERPL-SCNC: 3.6 MMOL/L (ref 3.5–5.5)
PROT SERPL-MCNC: 7.1 G/DL (ref 6.4–8.2)
PROT UR STRIP-MCNC: NEGATIVE MG/DL
RBC # BLD AUTO: 4.71 M/UL (ref 4.2–5.3)
SODIUM SERPL-SCNC: 137 MMOL/L (ref 136–145)
SP GR UR REFRACTOMETRY: 1.01 (ref 1–1.03)
UROBILINOGEN UR QL STRIP.AUTO: 0.2 EU/DL (ref 0.2–1)
WBC # BLD AUTO: 10.1 K/UL (ref 4.6–13.2)

## 2021-01-26 PROCEDURE — 96375 TX/PRO/DX INJ NEW DRUG ADDON: CPT

## 2021-01-26 PROCEDURE — 81025 URINE PREGNANCY TEST: CPT

## 2021-01-26 PROCEDURE — 81003 URINALYSIS AUTO W/O SCOPE: CPT

## 2021-01-26 PROCEDURE — 74177 CT ABD & PELVIS W/CONTRAST: CPT

## 2021-01-26 PROCEDURE — 99285 EMERGENCY DEPT VISIT HI MDM: CPT

## 2021-01-26 PROCEDURE — C9113 INJ PANTOPRAZOLE SODIUM, VIA: HCPCS | Performed by: EMERGENCY MEDICINE

## 2021-01-26 PROCEDURE — 85025 COMPLETE CBC W/AUTO DIFF WBC: CPT

## 2021-01-26 PROCEDURE — 74011250636 HC RX REV CODE- 250/636: Performed by: EMERGENCY MEDICINE

## 2021-01-26 PROCEDURE — 80053 COMPREHEN METABOLIC PANEL: CPT

## 2021-01-26 PROCEDURE — 76705 ECHO EXAM OF ABDOMEN: CPT

## 2021-01-26 PROCEDURE — 96374 THER/PROPH/DIAG INJ IV PUSH: CPT

## 2021-01-26 PROCEDURE — 96376 TX/PRO/DX INJ SAME DRUG ADON: CPT

## 2021-01-26 PROCEDURE — 74011000636 HC RX REV CODE- 636: Performed by: EMERGENCY MEDICINE

## 2021-01-26 PROCEDURE — 83690 ASSAY OF LIPASE: CPT

## 2021-01-26 RX ORDER — PANTOPRAZOLE SODIUM 40 MG/10ML
40 INJECTION, POWDER, LYOPHILIZED, FOR SOLUTION INTRAVENOUS
Status: COMPLETED | OUTPATIENT
Start: 2021-01-26 | End: 2021-01-26

## 2021-01-26 RX ORDER — MORPHINE SULFATE 4 MG/ML
4 INJECTION INTRAVENOUS
Status: COMPLETED | OUTPATIENT
Start: 2021-01-26 | End: 2021-01-26

## 2021-01-26 RX ORDER — DICYCLOMINE HYDROCHLORIDE 10 MG/1
20 CAPSULE ORAL
Qty: 30 CAP | Refills: 0 | OUTPATIENT
Start: 2021-01-26 | End: 2021-02-05

## 2021-01-26 RX ORDER — METOCLOPRAMIDE 10 MG/1
10 TABLET ORAL
Qty: 12 TAB | Refills: 0 | Status: SHIPPED | OUTPATIENT
Start: 2021-01-26 | End: 2021-02-05

## 2021-01-26 RX ADMIN — IOPAMIDOL 100 ML: 612 INJECTION, SOLUTION INTRAVENOUS at 19:41

## 2021-01-26 RX ADMIN — MORPHINE SULFATE 4 MG: 4 INJECTION INTRAVENOUS at 18:23

## 2021-01-26 RX ADMIN — MORPHINE SULFATE 4 MG: 4 INJECTION INTRAVENOUS at 20:15

## 2021-01-26 RX ADMIN — PANTOPRAZOLE SODIUM 40 MG: 40 INJECTION, POWDER, FOR SOLUTION INTRAVENOUS at 16:51

## 2021-01-26 NOTE — ED NOTES
Assumed care. Patient complains of epigastric pain since Sunday. Patient reports she was seen here x1 year ago for similar complaint and d/x for cholecycytis and was to follow up with gen surgery but canceled appt due to work. Patient appears uncomfortable. Placed on cardiac, bp, and pulse ox monitoring. Call bell in reach. Will continue to monitor.

## 2021-01-26 NOTE — ED PROVIDER NOTES
EMERGENCY DEPARTMENT HISTORY AND PHYSICAL EXAM    Date: 1/26/2021  Patient Name: Rhett Shelton    History of Presenting Illness     Chief Complaint   Patient presents with    Abdominal Pain         History Provided By: Patient    Fanny Wallace is a 41 y.o. female with PMHX of depression/anxiety, asthma, kidney stones, cholelithiasis in the past who presents to the emergency department C/O abdominal pain. Patient states that one year ago that she had cholelithiasis vs cholecystitis and was told that she needed a cholecystectomy however she decided last-minute not to go through with it because she started a new job. Today she states that her abdominal pain has returned and has been on the right side since this morning. Reports nausea with dry heaving. Denies diarrhea. Denies fever or URI symptoms. No exacerbating or relieving factors. PCP: Reilly Rodriguez NP    Current Outpatient Medications   Medication Sig Dispense Refill    metoclopramide HCl (Reglan) 10 mg tablet Take 1 Tab by mouth every six (6) hours as needed for Nausea for up to 10 days. 12 Tab 0    dicyclomine (BENTYL) 10 mg capsule Take 2 Caps by mouth four (4) times daily as needed for Abdominal Cramps. 30 Cap 0    omeprazole (PRILOSEC) 20 mg capsule Take 1 Cap by mouth daily. Indications: gastroesophageal reflux disease 30 Cap 0    ondansetron (ZOFRAN ODT) 4 mg disintegrating tablet Take 1 Tab by mouth every eight (8) hours as needed for Nausea. 15 Tab 0    etodolac (LODINE) 400 mg tablet Take 400 mg by mouth two (2) times daily (with meals). Indications: pain 20 Tab 0    quetiapine fumarate (SEROQUEL PO) Take  by mouth.  paroxetine HCl (PAXIL PO) Take  by mouth.  trazodone HCl (TRAZODONE, BULK,) by Does Not Apply route.  bupropion HCl (WELLBUTRIN PO) Take  by mouth.  amoxicillin-clavulanate (AUGMENTIN) 875-125 mg per tablet Take 1 Tab by mouth two (2) times a day.  20 Tab 0    albuterol (PROVENTIL HFA, VENTOLIN HFA) 90 mcg/actuation inhaler Take 1 Puff by inhalation every four (4) hours as needed. Past History     Past Medical History:  Past Medical History:   Diagnosis Date    Asthma     Chronic kidney disease     hx stones    GERD (gastroesophageal reflux disease)     Psychiatric disorder     depression/anxiety    Unspecified adverse effect of anesthesia     14yrs ago states \"heart stopped\" with anest. during c/s in [place]       Past Surgical History:  Past Surgical History:   Procedure Laterality Date    HX BREAST LUMPECTOMY Right     HX GYN      c/s x 2    HX ORTHOPAEDIC      rt foot surgery       Family History:  History reviewed. No pertinent family history. Social History:  Social History     Tobacco Use    Smoking status: Current Some Day Smoker     Packs/day: 1.00    Smokeless tobacco: Current User    Tobacco comment: smokes when drinking at times   Substance Use Topics    Alcohol use: Yes     Alcohol/week: 5.0 standard drinks     Types: 6 Cans of beer per week     Comment: per week    Drug use: No       Allergies: Allergies   Allergen Reactions    Percocet [Oxycodone-Acetaminophen] Nausea and Vomiting     Pt said she can take percocet.  Vicodin [Hydrocodone-Acetaminophen] Nausea and Vomiting         Review of Systems   Review of Systems   Constitutional: Negative for fever. Respiratory: Negative for cough and shortness of breath. Cardiovascular: Negative for chest pain. Gastrointestinal: Positive for abdominal pain and nausea. Negative for diarrhea. Genitourinary: Negative for dysuria. Musculoskeletal: Positive for back pain. All other systems reviewed and are negative.          Physical Exam     Vitals:    01/26/21 1953 01/26/21 2000 01/26/21 2100 01/26/21 2145   BP: 124/81 112/74 101/69 121/63   Pulse: 82 76 80 81   Resp: 14 17 23 16   Temp:       SpO2: 100% 99% 96% 100%   Weight:       Height:         Physical Exam    Nursing notes and vital signs reviewed    Constitutional: Non toxic appearing, appears uncomfortable but in no acute distress  Head: Normocephalic, Atraumatic  Eyes: EOMI  Neck: Supple  Cardiovascular: Regular rate and rhythm, no murmurs, rubs, or gallops  Chest: Normal work of breathing and chest excursion bilaterally  Lungs: Clear to ausculation bilaterally  Abdomen: Soft, non distended, normoactive bowel sounds. Tender in right upper and right lower quadrants. Back: No evidence of trauma or deformity  Extremities: No evidence of trauma or deformity, no LE edema  Skin: Warm and dry, normal cap refill  Neuro: Alert and appropriate,  normal speech, strength and sensation full and symmetric bilaterally, normal gait, normal coordination  Psychiatric: Normal mood and affect      Diagnostic Study Results     Labs -       Recent Results (from the past 12 hour(s))   CBC WITH AUTOMATED DIFF    Collection Time: 01/26/21  4:30 PM   Result Value Ref Range    WBC 10.1 4.6 - 13.2 K/uL    RBC 4.71 4.20 - 5.30 M/uL    HGB 14.0 12.0 - 16.0 g/dL    HCT 43.0 35.0 - 45.0 %    MCV 91.3 74.0 - 97.0 FL    MCH 29.7 24.0 - 34.0 PG    MCHC 32.6 31.0 - 37.0 g/dL    RDW 13.8 11.6 - 14.5 %    PLATELET 262 943 - 004 K/uL    MPV 11.4 9.2 - 11.8 FL    NEUTROPHILS 74 (H) 40 - 73 %    LYMPHOCYTES 17 (L) 21 - 52 %    MONOCYTES 7 3 - 10 %    EOSINOPHILS 2 0 - 5 %    BASOPHILS 0 0 - 2 %    ABS. NEUTROPHILS 7.5 1.8 - 8.0 K/UL    ABS. LYMPHOCYTES 1.7 0.9 - 3.6 K/UL    ABS. MONOCYTES 0.8 0.05 - 1.2 K/UL    ABS. EOSINOPHILS 0.2 0.0 - 0.4 K/UL    ABS.  BASOPHILS 0.0 0.0 - 0.1 K/UL    DF AUTOMATED     METABOLIC PANEL, COMPREHENSIVE    Collection Time: 01/26/21  4:30 PM   Result Value Ref Range    Sodium 137 136 - 145 mmol/L    Potassium 3.6 3.5 - 5.5 mmol/L    Chloride 103 100 - 111 mmol/L    CO2 31 21 - 32 mmol/L    Anion gap 3 3.0 - 18 mmol/L    Glucose 77 74 - 99 mg/dL    BUN 13 7.0 - 18 MG/DL    Creatinine 0.64 0.6 - 1.3 MG/DL    BUN/Creatinine ratio 20 12 - 20      GFR est AA >60 >60 ml/min/1.73m2    GFR est non-AA >60 >60 ml/min/1.73m2    Calcium 9.6 8.5 - 10.1 MG/DL    Bilirubin, total 0.3 0.2 - 1.0 MG/DL    ALT (SGPT) 30 13 - 56 U/L    AST (SGOT) 11 10 - 38 U/L    Alk. phosphatase 93 45 - 117 U/L    Protein, total 7.1 6.4 - 8.2 g/dL    Albumin 3.8 3.4 - 5.0 g/dL    Globulin 3.3 2.0 - 4.0 g/dL    A-G Ratio 1.2 0.8 - 1.7     URINALYSIS W/ RFLX MICROSCOPIC    Collection Time: 01/26/21  4:30 PM   Result Value Ref Range    Color YELLOW      Appearance CLEAR      Specific gravity 1.014 1.005 - 1.030      pH (UA) 6.5 5.0 - 8.0      Protein Negative NEG mg/dL    Glucose Negative NEG mg/dL    Ketone 40 (A) NEG mg/dL    Bilirubin Negative NEG      Blood Negative NEG      Urobilinogen 0.2 0.2 - 1.0 EU/dL    Nitrites Negative NEG      Leukocyte Esterase Negative NEG     LIPASE    Collection Time: 01/26/21  4:30 PM   Result Value Ref Range    Lipase 66 (L) 73 - 393 U/L   HCG URINE, QL. - POC    Collection Time: 01/26/21  7:34 PM   Result Value Ref Range    Pregnancy test,urine (POC) Negative NEG       Radiologic Studies -   CT ABD PELV W CONT   Final Result      No evidence of bowel obstruction or acute inflammatory process in the abdomen or   pelvis. Specifically, the appendix is visualized and unremarkable. US GALLBLADDER   Final Result      No acute sonographic abnormality in the right upper quadrant of the abdomen. Inspissated bile within the gallbladder lumen. CT Results  (Last 48 hours)    None        CXR Results  (Last 48 hours)    None          Medications given in the ED-  Medications   pantoprazole (PROTONIX) injection 40 mg (40 mg IntraVENous Given 1/26/21 1651)   morphine injection 4 mg (4 mg IntraVENous Given 1/26/21 1823)   iopamidoL (ISOVUE 300) 61 % contrast injection 100 mL (100 mL IntraVENous Given 1/26/21 1941)   morphine injection 4 mg (4 mg IntraVENous Given 1/26/21 2015)         Medical Decision Making   I am the first provider for this patient.     I reviewed the vital signs, available nursing notes, past medical history, past surgical history, family history and social history. Vital Signs-Reviewed the patient's vital signs. Pulse Oximetry Analysis -99% on room air, not hypoxic     Records Reviewed: Nursing Notes    Provider Notes (Medical Decision Making): Asad Interiano is a 41 y.o. female with history of gallstones presenting with right upper and right lower quadrant pain. Patient received Protonix and stated that did not help. Lab work demonstrates no acute abnormality. Will begin with right upper quadrant ultrasound to rule out acute cholecystitis. Will treat for pain and reassess. ED Course:   Patient remains uncomfortable after Protonix. Given 1 dose of morphine. Ultrasound returned demonstrating no acute cholecystitis, Inspissated bile within the gallbladder lumen. Patient to CT rule out appendicitis or other acute cause for her pain. After CT, patient reporting that she still has pain. Second dose of morphine ordered with improvement in symptoms. CT with no acute abnormality. Finding c/w bilary colic. Dr. Wagner Del Rio contacted, to followup outpatient tomorrow. Pt to be discharged on reglan and bentyl. Diagnosis and Disposition       DISCHARGE NOTE:    Suly Martinez Sergio's  results have been reviewed with her. She has been counseled regarding her diagnosis, treatment, and plan. She verbally conveys understanding and agreement of the signs, symptoms, diagnosis, treatment and prognosis and additionally agrees to follow up as discussed. She also agrees with the care-plan and conveys that all of her questions have been answered. I have also provided discharge instructions for her that include: educational information regarding their diagnosis and treatment, and list of reasons why they would want to return to the ED prior to their follow-up appointment, should her condition change.  She has been provided with education for proper emergency department utilization. CLINICAL IMPRESSION:    1. Gall bladder disease        PLAN:  1. D/C Home  2. Discharge Medication List as of 1/26/2021  9:33 PM      START taking these medications    Details   metoclopramide HCl (Reglan) 10 mg tablet Take 1 Tab by mouth every six (6) hours as needed for Nausea for up to 10 days. , Normal, Disp-12 Tab, R-0      dicyclomine (BENTYL) 10 mg capsule Take 2 Caps by mouth four (4) times daily as needed for Abdominal Cramps., Normal, Disp-30 Cap, R-0         CONTINUE these medications which have NOT CHANGED    Details   omeprazole (PRILOSEC) 20 mg capsule Take 1 Cap by mouth daily. Indications: gastroesophageal reflux disease, Print, Disp-30 Cap, R-0      ondansetron (ZOFRAN ODT) 4 mg disintegrating tablet Take 1 Tab by mouth every eight (8) hours as needed for Nausea. , Print, Disp-15 Tab, R-0      etodolac (LODINE) 400 mg tablet Take 400 mg by mouth two (2) times daily (with meals). Indications: pain, Print, Disp-20 Tab, R-0      quetiapine fumarate (SEROQUEL PO) Take  by mouth., Historical Med      paroxetine HCl (PAXIL PO) Take  by mouth., Historical Med      trazodone HCl (TRAZODONE, BULK,) by Does Not Apply route., Historical Med      bupropion HCl (WELLBUTRIN PO) Take  by mouth., Historical Med      amoxicillin-clavulanate (AUGMENTIN) 875-125 mg per tablet Take 1 Tab by mouth two (2) times a day., Print, Disp-20 Tab, R-0      albuterol (PROVENTIL HFA, VENTOLIN HFA) 90 mcg/actuation inhaler Take 1 Puff by inhalation every four (4) hours as needed., Historical Med           3. Follow-up Information     Follow up With Specialties Details Why Contact Info    Jus Harris MD General Surgery In 2 days  [address]  [phone]          _______________________________      Please note that this dictation was completed with [employer], the [employer] voice recognition software.   Quite often unanticipated grammatical, syntax, homophones, and other interpretive errors are inadvertently transcribed by the computer software. Please disregard these errors. Please excuse any errors that have escaped final proofreading.

## 2021-01-26 NOTE — Clinical Note
[facility] [place] 
[facility] EMERGENCY DEPT 
[address] [address] [phone] Work/School Note Date: 1/26/2021 To Whom It May concern: 
 
Irma Babb was seen and treated today in the emergency room by the following provider(s): 
Attending Provider: Lisa Escobar DO. Irma Babb is excused from work/school on 01/26/21 and 01/27/21. She is medically clear to return to work/school on 1/28/2021.   
 
 
Sincerely, 
 
 
 
 
Sushant Yoo MD

## 2021-01-26 NOTE — ED TRIAGE NOTES
Pt in for c/o upper abdominal pain that started Sunday and has since worsened. Pt reports a hx of Cholecystitis.

## 2021-01-26 NOTE — Clinical Note
[facility] [place] 
THE [facility] EMERGENCY DEPT 
[address] [address] [phone] Work/School Note Date: 1/26/2021 To Whom It May concern: 
 
Rhett Walton was seen and treated today in the emergency room by the following provider(s): 
Attending Provider: Fidel Apodaca DO. Rhett Walton is excused from work/school on 1/26/2021 through 1/29/2021. She is medically clear to return to work/school on 1/30/2021.   
  
 
Sincerely, 
 
 
 
 
Christal Renee MD

## 2021-02-05 ENCOUNTER — HOSPITAL ENCOUNTER (EMERGENCY)
Age: 46
Discharge: HOME | End: 2021-02-05
Attending: EMERGENCY MEDICINE
Payer: COMMERCIAL

## 2021-02-05 VITALS
OXYGEN SATURATION: 99 % | HEIGHT: 62 IN | SYSTOLIC BLOOD PRESSURE: 132 MMHG | RESPIRATION RATE: 18 BRPM | WEIGHT: 172 LBS | TEMPERATURE: 97.5 F | HEART RATE: 72 BPM | BODY MASS INDEX: 31.65 KG/M2 | DIASTOLIC BLOOD PRESSURE: 81 MMHG

## 2021-02-05 DIAGNOSIS — R11.2 NON-INTRACTABLE VOMITING WITH NAUSEA, UNSPECIFIED VOMITING TYPE: ICD-10-CM

## 2021-02-05 DIAGNOSIS — R10.13 ABDOMINAL PAIN, EPIGASTRIC: Primary | ICD-10-CM

## 2021-02-05 DIAGNOSIS — Z87.19 HISTORY OF GALLBLADDER DISEASE: ICD-10-CM

## 2021-02-05 LAB
ALBUMIN SERPL-MCNC: 3.6 G/DL (ref 3.4–5)
ALBUMIN/GLOB SERPL: 1 {RATIO} (ref 0.8–1.7)
ALP SERPL-CCNC: 80 U/L (ref 45–117)
ALT SERPL-CCNC: 20 U/L (ref 13–56)
ANION GAP SERPL CALC-SCNC: 8 MMOL/L (ref 3–18)
APPEARANCE UR: CLEAR
AST SERPL-CCNC: 8 U/L (ref 10–38)
BASOPHILS # BLD: 0 K/UL (ref 0–0.1)
BASOPHILS NFR BLD: 0 % (ref 0–2)
BILIRUB SERPL-MCNC: 0.3 MG/DL (ref 0.2–1)
BILIRUB UR QL: NEGATIVE
BUN SERPL-MCNC: 11 MG/DL (ref 7–18)
BUN/CREAT SERPL: 18 (ref 12–20)
CALCIUM SERPL-MCNC: 9.5 MG/DL (ref 8.5–10.1)
CHLORIDE SERPL-SCNC: 105 MMOL/L (ref 100–111)
CO2 SERPL-SCNC: 27 MMOL/L (ref 21–32)
COLOR UR: YELLOW
CREAT SERPL-MCNC: 0.6 MG/DL (ref 0.6–1.3)
DIFFERENTIAL METHOD BLD: NORMAL
EOSINOPHIL # BLD: 0.2 K/UL (ref 0–0.4)
EOSINOPHIL NFR BLD: 2 % (ref 0–5)
ERYTHROCYTE [DISTWIDTH] IN BLOOD BY AUTOMATED COUNT: 13.3 % (ref 11.6–14.5)
GLOBULIN SER CALC-MCNC: 3.5 G/DL (ref 2–4)
GLUCOSE SERPL-MCNC: 83 MG/DL (ref 74–99)
GLUCOSE UR STRIP.AUTO-MCNC: NEGATIVE MG/DL
HCT VFR BLD AUTO: 43.1 % (ref 35–45)
HGB BLD-MCNC: 14.3 G/DL (ref 12–16)
HGB UR QL STRIP: NEGATIVE
KETONES UR QL STRIP.AUTO: NEGATIVE MG/DL
LEUKOCYTE ESTERASE UR QL STRIP.AUTO: NEGATIVE
LIPASE SERPL-CCNC: 79 U/L (ref 73–393)
LYMPHOCYTES # BLD: 3.4 K/UL (ref 0.9–3.6)
LYMPHOCYTES NFR BLD: 29 % (ref 21–52)
MCH RBC QN AUTO: 30.5 PG (ref 24–34)
MCHC RBC AUTO-ENTMCNC: 33.2 G/DL (ref 31–37)
MCV RBC AUTO: 91.9 FL (ref 74–97)
MONOCYTES # BLD: 1.1 K/UL (ref 0.05–1.2)
MONOCYTES NFR BLD: 9 % (ref 3–10)
NEUTS SEG # BLD: 6.9 K/UL (ref 1.8–8)
NEUTS SEG NFR BLD: 60 % (ref 40–73)
NITRITE UR QL STRIP.AUTO: NEGATIVE
PH UR STRIP: 6 [PH] (ref 5–8)
PLATELET # BLD AUTO: 372 K/UL (ref 135–420)
PMV BLD AUTO: 10.6 FL (ref 9.2–11.8)
POTASSIUM SERPL-SCNC: 4 MMOL/L (ref 3.5–5.5)
PROT SERPL-MCNC: 7.1 G/DL (ref 6.4–8.2)
PROT UR STRIP-MCNC: NEGATIVE MG/DL
RBC # BLD AUTO: 4.69 M/UL (ref 4.2–5.3)
SODIUM SERPL-SCNC: 140 MMOL/L (ref 136–145)
SP GR UR REFRACTOMETRY: 1.01 (ref 1–1.03)
UROBILINOGEN UR QL STRIP.AUTO: 0.2 EU/DL (ref 0.2–1)
WBC # BLD AUTO: 11.6 K/UL (ref 4.6–13.2)

## 2021-02-05 PROCEDURE — 80053 COMPREHEN METABOLIC PANEL: CPT

## 2021-02-05 PROCEDURE — 96375 TX/PRO/DX INJ NEW DRUG ADDON: CPT

## 2021-02-05 PROCEDURE — 99283 EMERGENCY DEPT VISIT LOW MDM: CPT

## 2021-02-05 PROCEDURE — 74011250637 HC RX REV CODE- 250/637: Performed by: PHYSICIAN ASSISTANT

## 2021-02-05 PROCEDURE — 81003 URINALYSIS AUTO W/O SCOPE: CPT

## 2021-02-05 PROCEDURE — 83690 ASSAY OF LIPASE: CPT

## 2021-02-05 PROCEDURE — 74011250636 HC RX REV CODE- 250/636: Performed by: PHYSICIAN ASSISTANT

## 2021-02-05 PROCEDURE — 96374 THER/PROPH/DIAG INJ IV PUSH: CPT

## 2021-02-05 PROCEDURE — 85025 COMPLETE CBC W/AUTO DIFF WBC: CPT

## 2021-02-05 PROCEDURE — C9113 INJ PANTOPRAZOLE SODIUM, VIA: HCPCS | Performed by: PHYSICIAN ASSISTANT

## 2021-02-05 RX ORDER — DICYCLOMINE HYDROCHLORIDE 10 MG/1
40 CAPSULE ORAL
Status: COMPLETED | OUTPATIENT
Start: 2021-02-05 | End: 2021-02-05

## 2021-02-05 RX ORDER — ONDANSETRON 4 MG/1
4 TABLET, ORALLY DISINTEGRATING ORAL
Qty: 20 TAB | Refills: 0 | Status: SHIPPED | OUTPATIENT
Start: 2021-02-05 | End: 2021-02-15

## 2021-02-05 RX ORDER — HYDROCODONE BITARTRATE AND ACETAMINOPHEN 5; 325 MG/1; MG/1
1 TABLET ORAL
Qty: 12 TAB | Refills: 0 | Status: SHIPPED | OUTPATIENT
Start: 2021-02-05 | End: 2021-02-08

## 2021-02-05 RX ORDER — PANTOPRAZOLE SODIUM 40 MG/10ML
40 INJECTION, POWDER, LYOPHILIZED, FOR SOLUTION INTRAVENOUS
Status: COMPLETED | OUTPATIENT
Start: 2021-02-05 | End: 2021-02-05

## 2021-02-05 RX ORDER — ONDANSETRON 2 MG/ML
4 INJECTION INTRAMUSCULAR; INTRAVENOUS
Status: COMPLETED | OUTPATIENT
Start: 2021-02-05 | End: 2021-02-05

## 2021-02-05 RX ORDER — DICYCLOMINE HYDROCHLORIDE 10 MG/5ML
20 SOLUTION ORAL 4 TIMES DAILY
Qty: 473 ML | Refills: 0 | Status: SHIPPED | OUTPATIENT
Start: 2021-02-05 | End: 2021-02-15

## 2021-02-05 RX ADMIN — DICYCLOMINE HYDROCHLORIDE 40 MG: 10 CAPSULE ORAL at 21:23

## 2021-02-05 RX ADMIN — ONDANSETRON 4 MG: 2 INJECTION INTRAMUSCULAR; INTRAVENOUS at 21:23

## 2021-02-05 RX ADMIN — PANTOPRAZOLE SODIUM 40 MG: 40 INJECTION, POWDER, FOR SOLUTION INTRAVENOUS at 21:23

## 2021-02-06 NOTE — ED NOTES
Pt ambulatory to ED bed with c/o ABD pain, bloating, and nausea that has been persistent for the past year. Pt states she is scheduled to have her gallbladder surgery on 2/17/2021. She states she was seen here previously for the same pain and was given RX for Bentyl. She states this was helping her pain , but she is out of the medication. She states she called her physician and was advised to take Tylenol which is not helping the pain. She is AAO x 4 in NAD. Respirations regular/unlabored. ABD tender and distended. Denies further complaints.

## 2021-02-06 NOTE — ED TRIAGE NOTES
Patient c/o epigastric burning and abdominal swelling which has been intermittent x1 year. Patient recently seen in ED for same and followed up with GI and has gallbladder removal scheduled for 2/17. Patient has been taking Bentyl as prescribed with refill but ran out yesterday and is requesting a refill.

## 2021-02-06 NOTE — ED PROVIDER NOTES
EMERGENCY DEPARTMENT HISTORY AND PHYSICAL EXAM    Date: 2/5/2021  Patient Name: Oriana Ness    History of Presenting Illness     Chief Complaint   Patient presents with    Abdominal Pain    Medication Refill         History Provided By: Patient    Oriana Ness is a 41 y.o. female with PMHX of gallbladder disease who presents to the emergency department C/O abdominal pain. Associated sxs include nausea. Patient reports upper abdominal pain with nausea intermittently for the last year getting worse recently. Patient states she has been seen and evaluated by Dr. Nany Clancy and has a scheduled outpatient gallbladder surgery scheduled for 12 days from now. She has been using Bentyl as needed for pain which seems to help her pain enough to be tolerable. Patient states she is made a lot of diet diet modifications and really is unable to eat anything but Jell-O and drink water. Patient states the pain got worse today she ran out of her Bentyl yesterday is here asking if she can have a refill of medicines. Pt denies fever, vomiting, and any other sxs or complaints. PCP: Linda Urbina NP    Current Outpatient Medications   Medication Sig Dispense Refill    ondansetron (Zofran ODT) 4 mg disintegrating tablet 1 Tab by SubLINGual route every eight (8) hours as needed for Nausea or Vomiting. 20 Tab 0    dicyclomine (BENTYL) 10 mg/5 mL soln oral solution Take 10 mL by mouth four (4) times daily. 473 mL 0    HYDROcodone-acetaminophen (Norco) 5-325 mg per tablet Take 1 Tab by mouth every four (4) hours as needed for Pain for up to 3 days. Max Daily Amount: 6 Tabs. 12 Tab 0    metoclopramide HCl (Reglan) 10 mg tablet Take 1 Tab by mouth every six (6) hours as needed for Nausea for up to 10 days. 12 Tab 0    omeprazole (PRILOSEC) 20 mg capsule Take 1 Cap by mouth daily.  Indications: gastroesophageal reflux disease 30 Cap 0    etodolac (LODINE) 400 mg tablet Take 400 mg by mouth two (2) times daily (with meals). Indications: pain 20 Tab 0    quetiapine fumarate (SEROQUEL PO) Take  by mouth.  paroxetine HCl (PAXIL PO) Take  by mouth.  trazodone HCl (TRAZODONE, BULK,) by Does Not Apply route.  bupropion HCl (WELLBUTRIN PO) Take  by mouth.  amoxicillin-clavulanate (AUGMENTIN) 875-125 mg per tablet Take 1 Tab by mouth two (2) times a day. 20 Tab 0    albuterol (PROVENTIL HFA, VENTOLIN HFA) 90 mcg/actuation inhaler Take 1 Puff by inhalation every four (4) hours as needed. Past History     Past Medical History:  Past Medical History:   Diagnosis Date    Asthma     Chronic kidney disease     hx stones    GERD (gastroesophageal reflux disease)     Psychiatric disorder     depression/anxiety    Unspecified adverse effect of anesthesia     14yrs ago states \"heart stopped\" with anest. during c/s in [place]       Past Surgical History:  Past Surgical History:   Procedure Laterality Date    HX BREAST LUMPECTOMY Right     HX GYN      c/s x 2    HX ORTHOPAEDIC      rt foot surgery       Family History:  No family history on file. Social History:  Social History     Tobacco Use    Smoking status: Current Some Day Smoker     Packs/day: 1.00    Smokeless tobacco: Current User    Tobacco comment: smokes when drinking at times   Substance Use Topics    Alcohol use: Yes     Alcohol/week: 5.0 standard drinks     Types: 6 Cans of beer per week     Comment: per week    Drug use: No       Allergies: Allergies   Allergen Reactions    Percocet [Oxycodone-Acetaminophen] Nausea and Vomiting     Pt said she can take percocet.  Vicodin [Hydrocodone-Acetaminophen] Nausea and Vomiting         Review of Systems   Review of Systems   Constitutional: Negative for fever. HENT: Negative for congestion. Respiratory: Negative for cough. Cardiovascular: Negative for chest pain. Gastrointestinal: Positive for abdominal pain and nausea. Negative for vomiting.    All other systems reviewed and are negative.      Physical Exam     Vitals:    02/05/21 1926 02/05/21 1937   BP: 132/81    Pulse: 72    Resp: 18    Temp: 97.5 °F (36.4 °C)    SpO2: 99% 99%   Weight: 78 kg (172 lb)    Height: 5' 2\" (1.575 m)      Physical Exam  Vitals signs and nursing note reviewed.   Constitutional:       General: She is not in acute distress.     Appearance: She is well-developed and normal weight. She is not ill-appearing.   HENT:      Head: Normocephalic and atraumatic.   Eyes:      General: No scleral icterus.     Extraocular Movements: Extraocular movements intact.      Pupils: Pupils are equal, round, and reactive to light.   Cardiovascular:      Rate and Rhythm: Normal rate.   Pulmonary:      Effort: Pulmonary effort is normal.   Abdominal:      General: Abdomen is flat. Bowel sounds are normal.      Palpations: Abdomen is soft.      Tenderness: There is abdominal tenderness in the epigastric area and left upper quadrant.   Skin:     General: Skin is warm and dry.      Capillary Refill: Capillary refill takes less than 2 seconds.   Neurological:      General: No focal deficit present.      Mental Status: She is alert and oriented to person, place, and time.   Psychiatric:         Mood and Affect: Mood normal.         Behavior: Behavior normal.         Diagnostic Study Results     Labs -     Recent Results (from the past 12 hour(s))   CBC WITH AUTOMATED DIFF    Collection Time: 02/05/21  8:42 PM   Result Value Ref Range    WBC 11.6 4.6 - 13.2 K/uL    RBC 4.69 4.20 - 5.30 M/uL    HGB 14.3 12.0 - 16.0 g/dL    HCT 43.1 35.0 - 45.0 %    MCV 91.9 74.0 - 97.0 FL    MCH 30.5 24.0 - 34.0 PG    MCHC 33.2 31.0 - 37.0 g/dL    RDW 13.3 11.6 - 14.5 %    PLATELET 372 135 - 420 K/uL    MPV 10.6 9.2 - 11.8 FL    NEUTROPHILS 60 40 - 73 %    LYMPHOCYTES 29 21 - 52 %    MONOCYTES 9 3 - 10 %    EOSINOPHILS 2 0 - 5 %    BASOPHILS 0 0 - 2 %    ABS. NEUTROPHILS 6.9 1.8 - 8.0 K/UL    ABS. LYMPHOCYTES 3.4 0.9 - 3.6 K/UL    ABS.  MONOCYTES 1.1 0.05 - 1.2 K/UL    ABS. EOSINOPHILS 0.2 0.0 - 0.4 K/UL    ABS. BASOPHILS 0.0 0.0 - 0.1 K/UL    DF AUTOMATED     METABOLIC PANEL, COMPREHENSIVE    Collection Time: 02/05/21  8:42 PM   Result Value Ref Range    Sodium 140 136 - 145 mmol/L    Potassium 4.0 3.5 - 5.5 mmol/L    Chloride 105 100 - 111 mmol/L    CO2 27 21 - 32 mmol/L    Anion gap 8 3.0 - 18 mmol/L    Glucose 83 74 - 99 mg/dL    BUN 11 7.0 - 18 MG/DL    Creatinine 0.60 0.6 - 1.3 MG/DL    BUN/Creatinine ratio 18 12 - 20      GFR est AA >60 >60 ml/min/1.73m2    GFR est non-AA >60 >60 ml/min/1.73m2    Calcium 9.5 8.5 - 10.1 MG/DL    Bilirubin, total 0.3 0.2 - 1.0 MG/DL    ALT (SGPT) 20 13 - 56 U/L    AST (SGOT) 8 (L) 10 - 38 U/L    Alk. phosphatase 80 45 - 117 U/L    Protein, total 7.1 6.4 - 8.2 g/dL    Albumin 3.6 3.4 - 5.0 g/dL    Globulin 3.5 2.0 - 4.0 g/dL    A-G Ratio 1.0 0.8 - 1.7     LIPASE    Collection Time: 02/05/21  8:42 PM   Result Value Ref Range    Lipase 79 73 - 393 U/L   URINALYSIS W/ RFLX MICROSCOPIC    Collection Time: 02/05/21  8:42 PM   Result Value Ref Range    Color YELLOW      Appearance CLEAR      Specific gravity 1.009 1.005 - 1.030      pH (UA) 6.0 5.0 - 8.0      Protein Negative NEG mg/dL    Glucose Negative NEG mg/dL    Ketone Negative NEG mg/dL    Bilirubin Negative NEG      Blood Negative NEG      Urobilinogen 0.2 0.2 - 1.0 EU/dL    Nitrites Negative NEG      Leukocyte Esterase Negative NEG         Radiologic Studies -   No orders to display     CT Results  (Last 48 hours)    None        CXR Results  (Last 48 hours)    None          Medications given in the ED-  Medications   ondansetron (ZOFRAN) injection 4 mg (4 mg IntraVENous Given 2/5/21 2123)   pantoprazole (PROTONIX) injection 40 mg (40 mg IntraVENous Given 2/5/21 2123)   dicyclomine (BENTYL) capsule 40 mg (40 mg Oral Given 2/5/21 2123)         Medical Decision Making   I am the first provider for this patient.     I reviewed the vital signs, available nursing notes, past medical history, past surgical history, family history and social history. Vital Signs-Reviewed the patient's vital signs. Pulse Oximetry Analysis - 99% on RA     Records Reviewed: Nursing Notes    Procedures:  Procedures    ED Course:   8:35 PM   Initial assessment performed. The patients presenting problems have been discussed, and they are in agreement with the care plan formulated and outlined with them. I have encouraged them to ask questions as they arise throughout their visit. 9:25 PM   Reviewed labs with patient. Laboratory findings unremarkable. Patient is tolerating p.o. without vomiting. Will refill her Bentyl give Zofran move for discharge. Patient comfortable with this plan    Discussion: 41 y.o. female known gallstones and gallbladder disease scheduled outpatient cholecystectomy in 12 days complaining of upper abdominal pain with nausea running out of her Bentyl yesterday requesting medication refill. She is afebrile she is appropriate vital signs laboratory findings unremarkable. She is tolerating p.o. We will refill her Bentyl give Zofran PCP and general surgery follow-up with strict return precautions discussed. Diagnosis and Disposition       DISCHARGE NOTE:  Kun Boas Ortiz's  results have been reviewed with her. She has been counseled regarding her diagnosis, treatment, and plan. She verbally conveys understanding and agreement of the signs, symptoms, diagnosis, treatment and prognosis and additionally agrees to follow up as discussed. She also agrees with the care-plan and conveys that all of her questions have been answered. I have also provided discharge instructions for her that include: educational information regarding their diagnosis and treatment, and list of reasons why they would want to return to the ED prior to their follow-up appointment, should her condition change.  She has been provided with education for proper emergency department utilization. CLINICAL IMPRESSION:    1. Abdominal pain, epigastric    2. History of gallbladder disease    3. Non-intractable vomiting with nausea, unspecified vomiting type        PLAN:  1. D/C Home  2. Current Discharge Medication List      START taking these medications    Details   dicyclomine (BENTYL) 10 mg/5 mL soln oral solution Take 10 mL by mouth four (4) times daily. Qty: 473 mL, Refills: 0      HYDROcodone-acetaminophen (Norco) 5-325 mg per tablet Take 1 Tab by mouth every four (4) hours as needed for Pain for up to 3 days. Max Daily Amount: 6 Tabs. Qty: 12 Tab, Refills: 0    Associated Diagnoses: Abdominal pain, epigastric; History of gallbladder disease         CONTINUE these medications which have CHANGED    Details   ondansetron (Zofran ODT) 4 mg disintegrating tablet 1 Tab by SubLINGual route every eight (8) hours as needed for Nausea or Vomiting. Qty: 20 Tab, Refills: 0         STOP taking these medications       dicyclomine (BENTYL) 10 mg capsule Comments:   Reason for Stopping:             3.   Follow-up Information     Follow up With Specialties Details Why Contact Info    Roland Barrios MD General Surgery Call   [address] [address] [phone]      José Miguel Polanco NP Nurse Practitioner  for primary care follow up [address] [place] [address] [phone]      [facility] EMERGENCY DEPT Emergency Medicine  As needed, If symptoms worsen [address] [place] [id number] [phone]                  Please note that this dictation was completed with indico, the computer voice recognition software. Quite often unanticipated grammatical, syntax, homophones, and other interpretive errors are inadvertently transcribed by the computer software. Please disregard these errors. Please excuse any errors that have escaped final proofreading.

## 2021-02-09 ENCOUNTER — HOSPITAL ENCOUNTER (OUTPATIENT)
Dept: LAB | Age: 46
Discharge: HOME | End: 2021-02-09

## 2021-02-09 LAB — SENTARA SPECIMEN COL,SENBCF: NORMAL

## 2021-02-09 PROCEDURE — 99001 SPECIMEN HANDLING PT-LAB: CPT

## 2021-02-11 ENCOUNTER — HOSPITAL ENCOUNTER (OUTPATIENT)
Dept: PREADMISSION TESTING | Age: 46
Discharge: HOME | End: 2021-02-11
Payer: COMMERCIAL

## 2021-02-11 PROCEDURE — U0003 INFECTIOUS AGENT DETECTION BY NUCLEIC ACID (DNA OR RNA); SEVERE ACUTE RESPIRATORY SYNDROME CORONAVIRUS 2 (SARS-COV-2) (CORONAVIRUS DISEASE [COVID-19]), AMPLIFIED PROBE TECHNIQUE, MAKING USE OF HIGH THROUGHPUT TECHNOLOGIES AS DESCRIBED BY CMS-2020-01-R: HCPCS

## 2021-02-12 LAB — SARS-COV-2, COV2NT: NOT DETECTED

## 2021-02-17 ENCOUNTER — ANESTHESIA (OUTPATIENT)
Dept: SURGERY | Age: 46
End: 2021-02-17
Payer: COMMERCIAL

## 2021-02-17 ENCOUNTER — HOSPITAL ENCOUNTER (OUTPATIENT)
Age: 46
Setting detail: OUTPATIENT SURGERY
Discharge: HOME | End: 2021-02-17
Attending: SURGERY | Admitting: SURGERY
Payer: COMMERCIAL

## 2021-02-17 ENCOUNTER — ANESTHESIA EVENT (OUTPATIENT)
Dept: SURGERY | Age: 46
End: 2021-02-17
Payer: COMMERCIAL

## 2021-02-17 VITALS
TEMPERATURE: 98.5 F | BODY MASS INDEX: 32.59 KG/M2 | OXYGEN SATURATION: 96 % | SYSTOLIC BLOOD PRESSURE: 108 MMHG | WEIGHT: 177.13 LBS | DIASTOLIC BLOOD PRESSURE: 67 MMHG | HEART RATE: 76 BPM | RESPIRATION RATE: 14 BRPM | HEIGHT: 62 IN

## 2021-02-17 DIAGNOSIS — K80.20 GALLSTONES: Primary | ICD-10-CM

## 2021-02-17 LAB — HCG UR QL: NEGATIVE

## 2021-02-17 PROCEDURE — 77030006643: Performed by: ANESTHESIOLOGY

## 2021-02-17 PROCEDURE — 76010000138 HC OR TIME 0.5 TO 1 HR: Performed by: SURGERY

## 2021-02-17 PROCEDURE — 77030003578 HC NDL INSUF VERES AMR -B: Performed by: SURGERY

## 2021-02-17 PROCEDURE — 77030010507 HC ADH SKN DERMBND J&J -B: Performed by: SURGERY

## 2021-02-17 PROCEDURE — 76210000017 HC OR PH I REC 1.5 TO 2 HR: Performed by: SURGERY

## 2021-02-17 PROCEDURE — 74011250636 HC RX REV CODE- 250/636: Performed by: SURGERY

## 2021-02-17 PROCEDURE — 74011250636 HC RX REV CODE- 250/636: Performed by: ANESTHESIOLOGY

## 2021-02-17 PROCEDURE — 88307 TISSUE EXAM BY PATHOLOGIST: CPT

## 2021-02-17 PROCEDURE — 77030009403 HC ELECTRD ENDO MEGA -B: Performed by: SURGERY

## 2021-02-17 PROCEDURE — 77030012770 HC TRCR OPT FX AMR -B: Performed by: SURGERY

## 2021-02-17 PROCEDURE — 77030002966 HC SUT PDS J&J -A: Performed by: SURGERY

## 2021-02-17 PROCEDURE — 88313 SPECIAL STAINS GROUP 2: CPT

## 2021-02-17 PROCEDURE — 77030008574 HC TBNG SUC IRR STRY -B: Performed by: SURGERY

## 2021-02-17 PROCEDURE — 74011250636 HC RX REV CODE- 250/636: Performed by: NURSE ANESTHETIST, CERTIFIED REGISTERED

## 2021-02-17 PROCEDURE — 77030016151 HC PROTCTR LNS DFOG COVD -B: Performed by: SURGERY

## 2021-02-17 PROCEDURE — 77030018875 HC APPL CLP LIG4 J&J -B: Performed by: SURGERY

## 2021-02-17 PROCEDURE — 77030008518 HC TBNG INSUF ENDO STRY -B: Performed by: SURGERY

## 2021-02-17 PROCEDURE — 77030008477 HC STYL SATN SLP COVD -A: Performed by: ANESTHESIOLOGY

## 2021-02-17 PROCEDURE — 74011000258 HC RX REV CODE- 258: Performed by: SURGERY

## 2021-02-17 PROCEDURE — 76060000032 HC ANESTHESIA 0.5 TO 1 HR: Performed by: SURGERY

## 2021-02-17 PROCEDURE — 74011000250 HC RX REV CODE- 250: Performed by: SURGERY

## 2021-02-17 PROCEDURE — 77030010031 HC SCIS ENDOSC MPLR J&J -C: Performed by: SURGERY

## 2021-02-17 PROCEDURE — 77030020782 HC GWN BAIR PAWS FLX 3M -B: Performed by: SURGERY

## 2021-02-17 PROCEDURE — 74011000250 HC RX REV CODE- 250: Performed by: NURSE ANESTHETIST, CERTIFIED REGISTERED

## 2021-02-17 PROCEDURE — 88304 TISSUE EXAM BY PATHOLOGIST: CPT

## 2021-02-17 PROCEDURE — 76210000020 HC REC RM PH II FIRST 0.5 HR: Performed by: SURGERY

## 2021-02-17 PROCEDURE — 77030008683 HC TU ET CUF COVD -A: Performed by: ANESTHESIOLOGY

## 2021-02-17 PROCEDURE — 77030002933 HC SUT MCRYL J&J -A: Performed by: SURGERY

## 2021-02-17 PROCEDURE — 2709999900 HC NON-CHARGEABLE SUPPLY: Performed by: SURGERY

## 2021-02-17 PROCEDURE — 77030008608 HC TRCR ENDOSC SMTH AMR -B: Performed by: SURGERY

## 2021-02-17 PROCEDURE — 77030020829: Performed by: SURGERY

## 2021-02-17 PROCEDURE — 74011000258 HC RX REV CODE- 258: Performed by: NURSE ANESTHETIST, CERTIFIED REGISTERED

## 2021-02-17 PROCEDURE — 77030040361 HC SLV COMPR DVT MDII -B: Performed by: SURGERY

## 2021-02-17 PROCEDURE — 81025 URINE PREGNANCY TEST: CPT

## 2021-02-17 RX ORDER — GLYCOPYRROLATE 0.2 MG/ML
INJECTION INTRAMUSCULAR; INTRAVENOUS AS NEEDED
Status: DISCONTINUED | OUTPATIENT
Start: 2021-02-17 | End: 2021-02-17 | Stop reason: HOSPADM

## 2021-02-17 RX ORDER — SODIUM CHLORIDE 0.9 % (FLUSH) 0.9 %
5-40 SYRINGE (ML) INJECTION AS NEEDED
Status: DISCONTINUED | OUTPATIENT
Start: 2021-02-17 | End: 2021-02-17 | Stop reason: HOSPADM

## 2021-02-17 RX ORDER — BUPIVACAINE HYDROCHLORIDE 2.5 MG/ML
INJECTION, SOLUTION EPIDURAL; INFILTRATION; INTRACAUDAL AS NEEDED
Status: DISCONTINUED | OUTPATIENT
Start: 2021-02-17 | End: 2021-02-17 | Stop reason: HOSPADM

## 2021-02-17 RX ORDER — DEXAMETHASONE SODIUM PHOSPHATE 4 MG/ML
INJECTION, SOLUTION INTRA-ARTICULAR; INTRALESIONAL; INTRAMUSCULAR; INTRAVENOUS; SOFT TISSUE AS NEEDED
Status: DISCONTINUED | OUTPATIENT
Start: 2021-02-17 | End: 2021-02-17 | Stop reason: HOSPADM

## 2021-02-17 RX ORDER — ONDANSETRON 2 MG/ML
INJECTION INTRAMUSCULAR; INTRAVENOUS AS NEEDED
Status: DISCONTINUED | OUTPATIENT
Start: 2021-02-17 | End: 2021-02-17 | Stop reason: HOSPADM

## 2021-02-17 RX ORDER — PROPOFOL 10 MG/ML
INJECTION, EMULSION INTRAVENOUS AS NEEDED
Status: DISCONTINUED | OUTPATIENT
Start: 2021-02-17 | End: 2021-02-17 | Stop reason: HOSPADM

## 2021-02-17 RX ORDER — METOCLOPRAMIDE HYDROCHLORIDE 5 MG/ML
INJECTION INTRAMUSCULAR; INTRAVENOUS AS NEEDED
Status: DISCONTINUED | OUTPATIENT
Start: 2021-02-17 | End: 2021-02-17 | Stop reason: HOSPADM

## 2021-02-17 RX ORDER — HYDROMORPHONE HYDROCHLORIDE 1 MG/ML
0.5 INJECTION, SOLUTION INTRAMUSCULAR; INTRAVENOUS; SUBCUTANEOUS
Status: DISCONTINUED | OUTPATIENT
Start: 2021-02-17 | End: 2021-02-17 | Stop reason: HOSPADM

## 2021-02-17 RX ORDER — KETOROLAC TROMETHAMINE 15 MG/ML
INJECTION, SOLUTION INTRAMUSCULAR; INTRAVENOUS AS NEEDED
Status: DISCONTINUED | OUTPATIENT
Start: 2021-02-17 | End: 2021-02-17 | Stop reason: HOSPADM

## 2021-02-17 RX ORDER — MIDAZOLAM HYDROCHLORIDE 1 MG/ML
INJECTION, SOLUTION INTRAMUSCULAR; INTRAVENOUS AS NEEDED
Status: DISCONTINUED | OUTPATIENT
Start: 2021-02-17 | End: 2021-02-17 | Stop reason: HOSPADM

## 2021-02-17 RX ORDER — FENTANYL CITRATE 50 UG/ML
50 INJECTION, SOLUTION INTRAMUSCULAR; INTRAVENOUS AS NEEDED
Status: DISCONTINUED | OUTPATIENT
Start: 2021-02-17 | End: 2021-02-17 | Stop reason: HOSPADM

## 2021-02-17 RX ORDER — KETAMINE HYDROCHLORIDE 10 MG/ML
INJECTION, SOLUTION INTRAMUSCULAR; INTRAVENOUS AS NEEDED
Status: DISCONTINUED | OUTPATIENT
Start: 2021-02-17 | End: 2021-02-17 | Stop reason: HOSPADM

## 2021-02-17 RX ORDER — SODIUM CHLORIDE, SODIUM LACTATE, POTASSIUM CHLORIDE, CALCIUM CHLORIDE 600; 310; 30; 20 MG/100ML; MG/100ML; MG/100ML; MG/100ML
125 INJECTION, SOLUTION INTRAVENOUS CONTINUOUS
Status: DISCONTINUED | OUTPATIENT
Start: 2021-02-17 | End: 2021-02-17 | Stop reason: HOSPADM

## 2021-02-17 RX ORDER — SUCCINYLCHOLINE CHLORIDE 100 MG/5ML
SYRINGE (ML) INTRAVENOUS AS NEEDED
Status: DISCONTINUED | OUTPATIENT
Start: 2021-02-17 | End: 2021-02-17 | Stop reason: HOSPADM

## 2021-02-17 RX ORDER — FLUMAZENIL 0.1 MG/ML
0.2 INJECTION INTRAVENOUS
Status: DISCONTINUED | OUTPATIENT
Start: 2021-02-17 | End: 2021-02-17 | Stop reason: HOSPADM

## 2021-02-17 RX ORDER — NEOSTIGMINE METHYLSULFATE 1 MG/ML
INJECTION, SOLUTION INTRAVENOUS AS NEEDED
Status: DISCONTINUED | OUTPATIENT
Start: 2021-02-17 | End: 2021-02-17 | Stop reason: HOSPADM

## 2021-02-17 RX ORDER — SODIUM CHLORIDE 0.9 % (FLUSH) 0.9 %
5-40 SYRINGE (ML) INJECTION EVERY 8 HOURS
Status: DISCONTINUED | OUTPATIENT
Start: 2021-02-17 | End: 2021-02-17 | Stop reason: HOSPADM

## 2021-02-17 RX ORDER — ROCURONIUM BROMIDE 10 MG/ML
INJECTION, SOLUTION INTRAVENOUS AS NEEDED
Status: DISCONTINUED | OUTPATIENT
Start: 2021-02-17 | End: 2021-02-17 | Stop reason: HOSPADM

## 2021-02-17 RX ORDER — OXYCODONE AND ACETAMINOPHEN 5; 325 MG/1; MG/1
1 TABLET ORAL
Qty: 20 TAB | Refills: 0 | Status: SHIPPED | OUTPATIENT
Start: 2021-02-17 | End: 2021-02-22

## 2021-02-17 RX ORDER — FENTANYL CITRATE 50 UG/ML
INJECTION, SOLUTION INTRAMUSCULAR; INTRAVENOUS AS NEEDED
Status: DISCONTINUED | OUTPATIENT
Start: 2021-02-17 | End: 2021-02-17 | Stop reason: HOSPADM

## 2021-02-17 RX ORDER — NALOXONE HYDROCHLORIDE 0.4 MG/ML
0.4 INJECTION, SOLUTION INTRAMUSCULAR; INTRAVENOUS; SUBCUTANEOUS AS NEEDED
Status: DISCONTINUED | OUTPATIENT
Start: 2021-02-17 | End: 2021-02-17 | Stop reason: HOSPADM

## 2021-02-17 RX ORDER — LIDOCAINE HYDROCHLORIDE 20 MG/ML
INJECTION, SOLUTION EPIDURAL; INFILTRATION; INTRACAUDAL; PERINEURAL AS NEEDED
Status: DISCONTINUED | OUTPATIENT
Start: 2021-02-17 | End: 2021-02-17 | Stop reason: HOSPADM

## 2021-02-17 RX ADMIN — GLYCOPYRROLATE 0.6 MG: 0.2 INJECTION INTRAMUSCULAR; INTRAVENOUS at 15:28

## 2021-02-17 RX ADMIN — KETAMINE HYDROCHLORIDE 20 MG: 10 INJECTION, SOLUTION INTRAMUSCULAR; INTRAVENOUS at 15:09

## 2021-02-17 RX ADMIN — HYDROMORPHONE HYDROCHLORIDE 0.5 MG: 1 INJECTION, SOLUTION INTRAMUSCULAR; INTRAVENOUS; SUBCUTANEOUS at 16:37

## 2021-02-17 RX ADMIN — SODIUM CHLORIDE, SODIUM LACTATE, POTASSIUM CHLORIDE, AND CALCIUM CHLORIDE 125 ML/HR: 600; 310; 30; 20 INJECTION, SOLUTION INTRAVENOUS at 13:36

## 2021-02-17 RX ADMIN — CEFOXITIN SODIUM 2 G: 2 POWDER, FOR SOLUTION INTRAVENOUS at 14:53

## 2021-02-17 RX ADMIN — ONDANSETRON HYDROCHLORIDE 4 MG: 2 INJECTION INTRAMUSCULAR; INTRAVENOUS at 15:18

## 2021-02-17 RX ADMIN — SODIUM CHLORIDE, SODIUM LACTATE, POTASSIUM CHLORIDE, AND CALCIUM CHLORIDE 125 ML: 600; 310; 30; 20 INJECTION, SOLUTION INTRAVENOUS at 16:20

## 2021-02-17 RX ADMIN — GLYCOPYRROLATE 0.2 MG: 0.2 INJECTION INTRAMUSCULAR; INTRAVENOUS at 14:53

## 2021-02-17 RX ADMIN — Medication 1 MG: at 15:32

## 2021-02-17 RX ADMIN — DEXAMETHASONE SODIUM PHOSPHATE 4 MG: 4 INJECTION, SOLUTION INTRAMUSCULAR; INTRAVENOUS at 15:09

## 2021-02-17 RX ADMIN — LIDOCAINE HYDROCHLORIDE 60 MG: 20 INJECTION, SOLUTION EPIDURAL; INFILTRATION; INTRACAUDAL; PERINEURAL at 15:00

## 2021-02-17 RX ADMIN — KETOROLAC TROMETHAMINE 15 MG: 15 INJECTION, SOLUTION INTRAMUSCULAR; INTRAVENOUS at 15:28

## 2021-02-17 RX ADMIN — ROCURONIUM BROMIDE 30 MG: 10 INJECTION, SOLUTION INTRAVENOUS at 15:04

## 2021-02-17 RX ADMIN — FENTANYL CITRATE 100 MCG: 50 INJECTION, SOLUTION INTRAMUSCULAR; INTRAVENOUS at 15:45

## 2021-02-17 RX ADMIN — PHENYLEPHRINE HYDROCHLORIDE 100 MCG: 10 INJECTION INTRAVENOUS at 15:15

## 2021-02-17 RX ADMIN — METOCLOPRAMIDE 10 MG: 5 INJECTION, SOLUTION INTRAMUSCULAR; INTRAVENOUS at 14:53

## 2021-02-17 RX ADMIN — FENTANYL CITRATE 100 MCG: 50 INJECTION, SOLUTION INTRAMUSCULAR; INTRAVENOUS at 14:57

## 2021-02-17 RX ADMIN — SODIUM CHLORIDE, SODIUM LACTATE, POTASSIUM CHLORIDE, AND CALCIUM CHLORIDE: 600; 310; 30; 20 INJECTION, SOLUTION INTRAVENOUS at 15:16

## 2021-02-17 RX ADMIN — Medication 3 MG: at 15:28

## 2021-02-17 RX ADMIN — ROCURONIUM BROMIDE 5 MG: 10 INJECTION, SOLUTION INTRAVENOUS at 15:00

## 2021-02-17 RX ADMIN — KETAMINE HYDROCHLORIDE 10 MG: 10 INJECTION, SOLUTION INTRAMUSCULAR; INTRAVENOUS at 15:12

## 2021-02-17 RX ADMIN — PROPOFOL 200 MG: 10 INJECTION, EMULSION INTRAVENOUS at 15:00

## 2021-02-17 RX ADMIN — HYDROMORPHONE HYDROCHLORIDE 0.5 MG: 1 INJECTION, SOLUTION INTRAMUSCULAR; INTRAVENOUS; SUBCUTANEOUS at 16:06

## 2021-02-17 RX ADMIN — SODIUM CHLORIDE, SODIUM LACTATE, POTASSIUM CHLORIDE, AND CALCIUM CHLORIDE 125 ML: 600; 310; 30; 20 INJECTION, SOLUTION INTRAVENOUS at 17:15

## 2021-02-17 RX ADMIN — ROCURONIUM BROMIDE 5 MG: 10 INJECTION, SOLUTION INTRAVENOUS at 15:12

## 2021-02-17 RX ADMIN — MIDAZOLAM 2 MG: 1 INJECTION INTRAMUSCULAR; INTRAVENOUS at 14:53

## 2021-02-17 RX ADMIN — HYDROMORPHONE HYDROCHLORIDE 0.5 MG: 1 INJECTION, SOLUTION INTRAMUSCULAR; INTRAVENOUS; SUBCUTANEOUS at 16:19

## 2021-02-17 RX ADMIN — Medication 100 MG: at 15:00

## 2021-02-17 NOTE — H&P
Assessment/Plan  # Detail Type Description    1. Assessment Calculus of gallbladder w chronic cholecyst w/o obstruction (K80.10). Patient Plan Discussed Dx and options and recommend she have lap danny soon. I have discussed the risks, benefits and alternatives of the procedure to the patient including bleeding, infection, DVT, CVA, MI, death, injury to the liver and bile ducts, bile leak and possible open procedure. They understand and wish to proceed. 2. Assessment Fatty liver (K76.0). 3. Assessment Body mass index (BMI) 31.0-31.9, adult (Z68.31). Plan Orders Today's instructions / counseling include(s) Dietary management education, guidance, and counseling. This 41year old female presents for Gallbladder Disease. History of Present Illness:  1. Gallbladder Disease   Location is epigastric and RUQ. There is radiation to back and right scapula. The patient describes it as colicky. Context includes after meals. Identified risk factors include obesity and prior biliary colic. Symptom is aggravated by fatty foods. She is also experiencing bloating, heartburn and nausea. Pertinent negatives include back pain, dyspnea, fever, jaundice, reflux and weight loss. Additional information: Hx gallstones seen previously years ago but had to cancel. Seen recenlty in ED and found to have sludge/gallstones. Augie Burns             PAST MEDICAL/SURGICAL HISTORY  (Detailed)    Disease/disorder Onset Date Management Date Comments   Fibroids 10/14/2013 Laparoscopic Myomectomy 10/14/2013    menometrorrhagia 10/14/2013 D&C/hysteroscopy 10/14/2013    foot surgery 2012       section 2006       section       Bilateral tubal ligation     Currently pregnant: no.     Family History  (Detailed)  Relationship Family Member Name  Age at Death Condition Onset Age Cause of Death       Family history of Hypertension  N       Family history of Asthma  N       Family history of Cancer  N       Family history of Coronary artery disease  N       Family history of Renal disease  N       Family history of Heart disease  N       Family history of Diabetes mellitus  N       Social History:  (Detailed)  Preferred language is Tajik. The patient does not need an . MARITAL STATUS/FAMILY/SOCIAL SUPPORT  Marital status:    Smoking status: Unknown if ever smoked. VAPING USE  Screened for vaping? No    TOBACCO/VAPING EXPOSURE  No passive smoke exposure. ALCOHOL  There is a history of alcohol use. CAFFEINE  The patient uses caffeine. LIFESTYLE  DIET  , high cholesterol. Medications (active prior to today)  Medication Name Sig Description Start Date Stop Date Refilled Rx Elsewhere   ProAir HFA 90 mcg/actuation aerosol inhaler inhale 2 puff by inhalation route  every 4 - 6 hours as needed 07/15/2014   N   tramadol 50 mg tablet take 1 tablet by oral route  every 6 hours as needed 04/03/2019 02/04/2021  N   Norco 5 mg-325 mg tablet take 1 tablet by oral route  every 6 hours as needed for pain 04/03/2019 02/04/2021  N   Ativan 2 mg tablet take 1 table by oral route 30 minutes prior to appointment. 04/03/2019 02/04/2021  N   hydroxyzine HCl 25 mg tablet take 1 tablet by oral route 3 times every day as needed 04/25/2019 02/04/2021  N   TRIAMCINOLONE 0.1% OINTMENT APPLY THIN COAT TO AFFECTED AREA TWICE A DAY 06/08/2020 06/08/2020 N     Medication Reconciliation  Medications reconciled today.   Medication Reviewed  Adherence Medication Name Sig Desc Elsewhere Status    alprazolam 2 mg tablet  Y DUPLICATE    alprazolam 2 mg tablet  Y DUPLICATE    alprazolam 2 mg tablet  Y DUPLICATE    alprazolam 2 mg tablet  Y DUPLICATE    alprazolam 2 mg tablet  Y DUPLICATE    ALPRAZOLAM 2 mg ORAL TABLET  Y Inactive    alprazolam 2 mg tablet  Y DUPLICATE    alprazolam 2 mg tablet  Y DUPLICATE    alprazolam 2 mg tablet  Y DUPLICATE    alprazolam 2 mg tablet  Y DUPLICATE    alprazolam 2 mg tablet  Y Inactive    alprazolam 2 mg tablet  Y DUPLICATE    alprazolam 2 mg tablet  Y DUPLICATE    alprazolam 2 mg tablet  Y DUPLICATE    alprazolam 2 mg tablet  Y DUPLICATE    alprazolam 2 mg tablet  Y DUPLICATE    alprazolam 2 mg tablet  Y DUPLICATE    ciprofloxacin 500 mg tablet  Y Inactive    ciprofloxacin 500 mg tablet  Y DUPLICATE    Doc-Q-Lace 100 mg capsule  Y Inactive    hydroxyzine pamoate 25 mg capsule  Y DUPLICATE    hydroxyzine pamoate 25 mg capsule  Y Inactive    hydroxyzine pamoate 25 mg capsule  Y DUPLICATE    hydroxyzine pamoate 25 mg capsule  Y Inactive    hydroxyzine pamoate 25 mg capsule  Y DUPLICATE    hydroxyzine pamoate 25 mg capsule  Y DUPLICATE    OXYCODONE HCL/ACETAMINOPHEN 5 mg-325 mg ORAL TABLET  Y DUPLICATE    IBUPROFEN 027 mg ORAL TABLET  Y Inactive    OXYCODONE HCL/ACETAMINOPHEN 5 mg-325 mg ORAL TABLET  Y Inactive    naproxen take 1 tablet by oral route 2 times every day with food Y Inactive    OXYCODONE HCL/ACETAMINOPHEN 5 mg-325 mg ORAL TABLET  Y DUPLICATE    paroxetine 40 mg tablet  Y DUPLICATE    paroxetine 40 mg tablet  Y DUPLICATE    paroxetine 40 mg tablet  Y DUPLICATE    paroxetine 40 mg tablet  Y DUPLICATE    paroxetine 40 mg tablet  Y DUPLICATE    paroxetine 40 mg tablet  Y DUPLICATE    paroxetine 40 mg tablet  Y DUPLICATE    paroxetine 40 mg tablet  Y DUPLICATE    paroxetine 40 mg tablet  Y DUPLICATE    paroxetine 40 mg tablet  Y DUPLICATE    paroxetine 40 mg tablet  Y Inactive    paroxetine 40 mg tablet  Y DUPLICATE    paroxetine 40 mg tablet  Y DUPLICATE    paroxetine 40 mg tablet  Y DUPLICATE    paroxetine 40 mg tablet  Y DUPLICATE    paroxetine 40 mg tablet  Y DUPLICATE    paroxetine 40 mg tablet  Y DUPLICATE    paroxetine 40 mg tablet  Y DUPLICATE    paroxetine 40 mg tablet  Y Inactive    paroxetine 40 mg tablet  Y DUPLICATE    paroxetine 40 mg tablet  Y DUPLICATE    paroxetine 40 mg tablet  Y DUPLICATE    paroxetine 40 mg tablet  Y DUPLICATE    paroxetine 40 mg tablet  Y DUPLICATE    paroxetine 40 mg tablet  Y DUPLICATE    paroxetine 40 mg tablet  Y DUPLICATE    paroxetine 40 mg tablet  Y DUPLICATE    paroxetine 40 mg tablet  Y DUPLICATE    paroxetine 40 mg tablet  Y DUPLICATE    paroxetine 40 mg tablet  Y DUPLICATE    quetiapine 400 mg tablet  Y DUPLICATE    quetiapine 400 mg tablet  Y DUPLICATE    quetiapine 400 mg tablet  Y DUPLICATE    quetiapine 400 mg tablet  Y DUPLICATE    quetiapine 400 mg tablet  Y Inactive    quetiapine 400 mg tablet  Y DUPLICATE    quetiapine 400 mg tablet  Y DUPLICATE    quetiapine 400 mg tablet  Y DUPLICATE    quetiapine 400 mg tablet  Y DUPLICATE    quetiapine 400 mg tablet  Y DUPLICATE    quetiapine 400 mg tablet  Y DUPLICATE    quetiapine 400 mg tablet  Y DUPLICATE    promethazine 25 mg tablet  Y Inactive    quetiapine 400 mg tablet  Y DUPLICATE    promethazine 25 mg tablet  Y Inactive    quetiapine 400 mg tablet  Y DUPLICATE    quetiapine 400 mg tablet  Y DUPLICATE    quetiapine 400 mg tablet  Y Inactive    quetiapine 400 mg tablet  Y DUPLICATE    quetiapine 400 mg tablet  Y DUPLICATE    quetiapine 400 mg tablet  Y DUPLICATE    quetiapine 400 mg tablet  Y DUPLICATE    quetiapine 400 mg tablet  Y DUPLICATE    quetiapine 400 mg tablet  Y DUPLICATE    quetiapine 400 mg tablet  Y DUPLICATE    quetiapine 400 mg tablet  Y DUPLICATE    quetiapine 400 mg tablet  Y DUPLICATE    quetiapine 400 mg tablet  Y DUPLICATE    quetiapine 400 mg tablet  Y DUPLICATE    quetiapine 400 mg tablet  Y DUPLICATE    quetiapine 400 mg tablet  Y DUPLICATE    quetiapine 400 mg tablet  Y DUPLICATE   taking as directed ProAir HFA 90 mcg/actuation aerosol inhaler inhale 2 puff by inhalation route  every 4 - 6 hours as needed N Verified   taking as directed TRIAMCINOLONE 0.1% OINTMENT APPLY THIN COAT TO AFFECTED AREA TWICE A DAY N Verified     Allergies:  Ingredient Reaction (Severity) Medication Name Comment   ACETAMINOPHEN nausea Vicodin    HYDROCODONE BITARTRATE nausea Vicodin    Reviewed, no changes. Review of Systems  System Neg/Pos Details   Constitutional Negative Fever, Night sweats and Weight loss. ENMT Negative Hearing loss, Tinnitus, Vertigo and Voice change. Eyes Negative Diplopia and Vision loss. Respiratory Positive Asthma. Respiratory Negative Asthma, Cough, Dyspnea, Hemoptysis, Known TB exposure and Wheezing. Cardio Negative Chest pain, Claudication, Edema, Irregular heartbeat/palpitations and Thrombophlebitis. GI Positive Bloating, Heartburn, Nausea, Reflux. GI Negative Dysphagia, Hemorrhoids, Jaundice and Reflux.  Positive Kidney stones.  Negative Dysuria, Nocturia, Passage stone/gravel and Urinary incontinence. Endocrine Negative Cold intolerance and Goiter. Neuro Negative Focal weakness, Headache, Paresthesia, Seizures and Syncope. Integumentary Negative Change in shape/size of mole(s) and Skin lesion. MS Negative Back pain, Bone/joint symptoms and Muscle weakness. Hema/Lymph Negative Easy bleeding and Easy bruising. Allergic/Immuno Negative Contact allergy and Contact dermatitis. Vital Signs   Height  Time ft in cm Last Measured Height Position   3:20 PM 5.0 3.00 160.02 02/04/2021 Standing   Weight/BSA/BMI  Time lb oz kg Context BMI kg/m2 BSA m2   3:20 .60  79.651 dressed without shoes 31.11    Blood Pressure  Time BP mm/Hg Position Side Site Method Cuff Size   3:20 /85 sitting left brachial manual adult   Temperature/Pulse/Respiration  Time Temp F Temp C Temp Site Pulse/min Pattern Resp/ min   3:20 PM 97.70 36.50 temporal 83 regular    Pulse Oximetry/FIO2  Time Pulse Ox (Rest %) Pulse Ox (Amb %) O2 Sat O2 L/Min Timing FiO2 % L/min Delivery Method Finger Probe   3:20 PM 94  RA      R Index   Pain Scale  Time Pain Score Method   3:20 PM 6/10 Numeric Pain Intensity Scale   Measured By  Time Measured by   3:20 PM Jesi Tran     Physical Exam:  Exam Findings Details   Constitutional Normal Well developed.    Eyes Normal Conjunctiva - Right: Normal, Left: Normal. Pupil - Right: Normal, Left: Normal.   Ears Normal Inspection - Right: Normal, Left: Normal. Hearing - Right: Normal.   Nose/Mouth/Throat Normal External nose - Normal.   Neck Exam Normal Inspection - Normal.   Respiratory Normal Inspection - Normal. Auscultation - Normal. Effort - Normal.   Cardiovascular Normal Regular rate and rhythm. No murmurs, gallops, or rubs. Vascular Normal Capillary refill - Less than 2 seconds. Abdomen * Obese. Abdomen Normal Umbilicus - Normal. No abdominal tenderness. No spleen enlargement. No hernia. Antonio's sign - Normal.   Skin Normal Inspection - Normal.   Musculoskeletal Normal Visual overview of all four extremities is normal.   Neurological Normal Memory - Normal. Cranial nerves - Cranial nerves I grossly intact, Cranial nerves II through XII grossly intact. Psychiatric Normal Orientation - Oriented to time, place, person & situation. Appropriate mood and affect. Normal insight. Normal judgment. Medications (added, continued, or stopped this visit):  Start Date Medication Directions PRN Status PRN Reason Instruction Stop Date   04/03/2019 Ativan 2 mg tablet take 1 table by oral route 30 minutes prior to appointment.  N   02/04/2021 04/25/2019 hydroxyzine HCl 25 mg tablet take 1 tablet by oral route 3 times every day as needed N   02/04/2021 04/03/2019 Norco 5 mg-325 mg tablet take 1 tablet by oral route  every 6 hours as needed for pain N   02/04/2021   07/15/2014 ProAir HFA 90 mcg/actuation aerosol inhaler inhale 2 puff by inhalation route  every 4 - 6 hours as needed N      04/03/2019 tramadol 50 mg tablet take 1 tablet by oral route  every 6 hours as needed N   02/04/2021 06/08/2020 TRIAMCINOLONE 0.1% OINTMENT APPLY THIN COAT TO AFFECTED AREA TWICE A DAY N            Active Patient Care Team Members    Name Contact Agency Type Support Role Relationship Active Date Inactive Date Specialty   Leann Miranda Jessica   Patient provider PCP   Family Practice

## 2021-02-17 NOTE — PERIOP NOTES
Paged Dr. Lilly Mckeon for patient sign out. Patient meets criteria for transfer to the next phase of care.

## 2021-02-17 NOTE — ANESTHESIA PREPROCEDURE EVALUATION
Relevant Problems   No relevant active problems       Anesthetic History     Other anesthesia complications     Comments: \"heart stopped during C/S 14 years ago in [place]\"     Review of Systems / Medical History  Patient summary reviewed, nursing notes reviewed and pertinent labs reviewed    Pulmonary            Asthma        Neuro/Psych         Psychiatric history (anxiety, depression)     Cardiovascular                Pertinent negatives: No hypertension  Exercise tolerance: >4 METS     GI/Hepatic/Renal     GERD        Pertinent negatives: No liver disease and renal disease  Comments: cholecystitis Endo/Other        Obesity     Other Findings            Physical Exam    Airway  Mallampati: II  TM Distance: 4 - 6 cm  Neck ROM: normal range of motion   Mouth opening: Normal     Cardiovascular    Rhythm: regular  Rate: normal         Dental  No notable dental hx       Pulmonary  Breath sounds clear to auscultation               Abdominal  GI exam deferred       Other Findings            Anesthetic Plan    ASA: 2  Anesthesia type: general          Induction: Intravenous  Anesthetic plan and risks discussed with: Patient      Plan GETA. Pt understands and accepts associated risks and agrees with plan. All questions answered. Consent signed.

## 2021-02-17 NOTE — INTERVAL H&P NOTE
Update History & Physical 
 
The Patient's History and Physical of February 17,  
 was reviewed with the patient and I examined the patient. There was no change. The surgical site was confirmed by the patient and me. Plan:  The risk, benefits, expected outcome, and alternative to the recommended procedure have been discussed with the patient. Patient understands and wants to proceed with the procedure.  
 
Electronically signed by Madison Zambrano MD on 2/17/2021 at 2:33 PM

## 2021-02-17 NOTE — ANESTHESIA POSTPROCEDURE EVALUATION
Post-Anesthesia Evaluation and Assessment    Cardiovascular Function/Vital Signs  Visit Vitals  /61   Pulse (!) 59   Temp 36.9 °C (98.5 °F)   Resp 14   Ht 5' 2\" (1.575 m)   Wt 80.3 kg (177 lb 2 oz)   SpO2 93%   BMI 32.40 kg/m²       Patient is status post Procedure(s):  LAPAROSCOPIC CHOLECYSTECTOMY, LIVER BIOPSY. Nausea/Vomiting: Controlled. Postoperative hydration reviewed and adequate. Pain:  Pain Scale 1: FLACC (02/17/21 1705)  Pain Intensity 1: 0 (02/17/21 1705)   Managed. Neurological Status:   Neuro (WDL): Within Defined Limits (02/17/21 1705)   At baseline. Mental Status and Level of Consciousness: Arousable. Pulmonary Status:   O2 Device: Room air (02/17/21 1705)   Adequate oxygenation and airway patent. Complications related to anesthesia: None    Post-anesthesia assessment completed. No concerns. Patient has met all discharge requirements.     Signed By: Julianne Mckee MD    February 17, 2021

## 2021-02-17 NOTE — PERIOP NOTES
Reviewed PTA medication list with patient/caregiver and patient/caregiver denies any additional medications. Patient admits to having a responsible adult care for them at home for at least 24 hours after surgery. Patient encouraged to use gown warming system and informed that using said warming gown to regulate body temperature prior to a procedure has been shown to help reduce the risks of blood clots and infection. Patient's pharmacy of choice verified and documented in PTA medication section. Dual skin assessment & fall risk band verification completed with Tra Ibrahim. Urine pregnancy results negative and verified with Jayden Baker. Admission Physical Assessment performed by Jayden Baker RN her findings documented by undersigned.

## 2021-02-17 NOTE — DISCHARGE INSTRUCTIONS
Post-Operative Discharge Instructions  Oneyda Hines. Liam Mccauley M.D.  [address] [place] [address]  [phone]    Patient: Brigitte Wolf MRN: [id number]  [facility]: [id number]    YOB: 1975  Age: 41 y.o. Sex: female    DOA: 2021 LOS:  LOS: 0 days   Discharge Date:      Acute Diagnoses:  GALLSTONES    Chronic Medical Diagnoses:      Diet  1. Resume prior to surgery diet as tolerated. Activity  1. Do not drive a car or operate any hazardous machinery the day of surgery. 2. Rest quietly today. 3. No bending or heavy lifting. 4. You may resume other prior to surgery activities as tolerated. 5. You may remove the bandage and shower in 1 day. Drain / Wound Care  1. Follow all drain / wound care instructions exactly as explained by the Nurse at time of discharge. 2. Apply an ice pack to the surgical site for 48 hours. 3. Do not put any salves or ointments on the wound. Allow it to form a dry scab. 4. Leave steri-strips / Dermabond alone. They should be allowed to fall off on their own in 7-14 days. Medications  1. It is important to take your medications exactly as they are prescribed. 2. Keep your medication in the bottles provided by the pharmacist, and keep a list of the medication names, dosages, and times they should be taken in your wallet. Call 911 anytime you think you may need emergency care. For example, call if:  · You passed out (lost consciousness). · You have severe trouble breathing. · You have sudden chest pain and shortness of breath. Notify your Surgeon for any of the followin. Fever, chills, nausea, vomiting, severe abdominal pain or bleeding. 2. If you experience any redness or discharge or sign of infection. 3. Persistent nausea lasting more than 24 hours. If you are unable to reach your Surgeon for any of the symptoms above, you should proceed directly to the nearest Emergency Department.     Post-Operative Appointment Information    Call Dr. Anneliese Ramirez office tomorrow morning at [phone] to schedule a post-operative office visit in one (1) week. If any questions or concerns arise, call your Surgeon at [phone]. DISCHARGE SUMMARY from Nurse    PATIENT INSTRUCTIONS:    After general anesthesia or intravenous sedation, for 24 hours or while taking prescription Narcotics:  · Limit your activities  · Do not drive and operate hazardous machinery  · Do not make important personal or business decisions  · Do  not drink alcoholic beverages  · If you have not urinated within 8 hours after discharge, please contact your surgeon on call. Report the following to your surgeon:  · Excessive pain, swelling, redness or odor of or around the surgical area  · Temperature over 100.5  · Nausea and vomiting lasting longer than 4 hours or if unable to take medications  · Any signs of decreased circulation or nerve impairment to extremity: change in color, persistent  numbness, tingling, coldness or increase pain  · Any questions    What to do at Home:  Recommended activity: Activity as tolerated and no driving for today,     If you experience any of the following symptoms as noted above, please follow up with Dr. Luis Estrada. *  Please give a list of your current medications to your Primary Care Provider. *  Please update this list whenever your medications are discontinued, doses are      changed, or new medications (including over-the-counter products) are added. *  Please carry medication information at all times in case of emergency situations. These are general instructions for a healthy lifestyle:    No smoking/ No tobacco products/ Avoid exposure to second hand smoke  Surgeon General's Warning:  Quitting smoking now greatly reduces serious risk to your health.     Obesity, smoking, and sedentary lifestyle greatly increases your risk for illness    A healthy diet, regular physical exercise & weight monitoring are important for maintaining a healthy lifestyle    You may be retaining fluid if you have a history of heart failure or if you experience any of the following symptoms:  Weight gain of 3 pounds or more overnight or 5 pounds in a week, increased swelling in our hands or feet or shortness of breath while lying flat in bed. Please call your doctor as soon as you notice any of these symptoms; do not wait until your next office visit. The discharge information has been reviewed with the patient and caregiver. The patient and caregiver verbalized understanding. Discharge medications reviewed with the patient and caregiver and appropriate educational materials and side effects teaching were provided. ___________________________________________________________________________________________________________________________________    Patient armband removed and shredded         10 Things to Do When You Have COVID-19    Stay home. Don't go to school, work, or public areas. And don't use public transportation, ride-shares, or taxis unless you have no choice. Leave your home only if you need to get medical care. But call the doctor's office first so they know you're coming. And wear a cloth face cover. Ask before leaving isolation. Talk with your doctor or other health professional about when it will be safe for you to leave isolation. Wear a cloth face cover when you are around other people. It can help stop the spread of the virus when you cough or sneeze. Limit contact with people in your home. If possible, stay in a separate bedroom and use a separate bathroom. Avoid contact with pets and other animals. If possible, have a friend or family member care for them while you're sick. Cover your mouth and nose with a tissue when you cough or sneeze. Then throw the tissue in the trash right away. Wash your hands often, especially after you cough or sneeze.   Use soap and water, and scrub for at least 20 seconds. If soap and water aren't available, use an alcohol-based hand . Don't share personal household items. These include bedding, towels, cups and glasses, and eating utensils. Clean and disinfect your home every day. Use household  or disinfectant wipes or sprays. Take special care to clean things that you grab with your hands. These include doorknobs, remote controls, phones, and handles on your refrigerator and microwave. And don't forget countertops, tabletops, bathrooms, and computer keyboards. Take acetaminophen (Tylenol) to relieve fever and body aches. Read and follow all instructions on the label. Current as of: December 18, 2020               Content Version: 12.7  © 2006-2021 [employer]. Care instructions adapted under license by [employer] (which disclaims liability or warranty for this information). If you have questions about a medical condition or this instruction, always ask your healthcare professional. [place] [address] any warranty or liability for your use of this information.

## 2021-02-17 NOTE — BRIEF OP NOTE
Brief Postoperative Note    Patient: Oriana Ness  YOB: 1975  MRN: [id number]    Date of Procedure: 2/17/2021     Pre-Op Diagnosis: GALLSTONES    Post-Op Diagnosis: Same as preoperative diagnosis. Procedure(s):  LAPAROSCOPIC CHOLECYSTECTOMY, LIVER BIOPSY    Surgeon(s):   Rudolph Luis MD    Surgical Assistant: Surg Asst-1: Leon Suero    Anesthesia: General     Estimated Blood Loss (mL): Minimal    Complications: None    Specimens:   ID Type Source Tests Collected by Time Destination   1 : GALLBLADDER AND CONTENTS Preservative Gallbladder  Rudolph Luis MD 2/17/2021 1512 Pathology   2 : LIVER BIOPSY Preservative Liver  Rudolph Luis MD 2/17/2021 1524 Pathology        Implants: * No implants in log *    Drains: * No LDAs found *    Findings: gallstones    Electronically Signed by Jody Love MD on 2/17/2021 at 4:09 PM

## 2021-02-19 NOTE — OP NOTES
OPERATIVE NOTE    Patient: Natalie Norris MRN: [id number]  CSN: [id number]    YOB: 1975  Age: 41 y.o. Sex: female      Indications: This is a 41y.o. year-old female who presents with gallstones. Date of Procedure: 2/17/2021     Preoperative Diagnosis: Cholelithiasis. Postoperative Diagnosis: Cholelithiasis. Fatty Liver    Procedure: Procedure(s):  LAPAROSCOPIC CHOLECYSTECTOMY, LIVER BIOPSY    Surgeon(s): Surgeon(s) and Role:     * Matheus Barrios MD - Primary    Anesthesia: General     Assistant:  Surg Asst-1: Jaxon May    Implants:  * No implants in log *    Procedure: The patient was placed in the supine position. Sedation was achieved by anesthesia. Her abdomen was prepped and draped in the usual fashion. An incision was made over the umbilicus with a blade and a Veress needle was inserted using 1-drop technique. A 5-mm Optiview trocar was placed under visualization. A 12-mm port was placed in the upper abdomen, and two 5-mm ports placed on the right side of the abdomen. The gallbladder was visualized. The remainder of the abdominal examination was unremarkable. The gallbladder was retracted laterally and superiorly, exposing the infundibulum. The infundibulum was dissected down to the cystic duct and cystic artery carefully. Both of these structures were identified and confirmed with critical view, they were ligated and divided using metal clips and laparoscopic scissors. The gallbladder was removed from the gallbladder fossa using the J-hook electrocautery. A wedge liver biopsy was done with the j hook. The gallbladder and biopsy  was removed through the top trocar port without difficulty. Adequate hemostasis was seen. All ports were removed. Skin was closed with 4-0 Monocryl in subcuticular closure and Dermabond. The patient was extubated and transferred to the recovery room in stable condition.     Estimated Blood Loss: * No values recorded between 2/17/2021  3:09 PM and 2/17/2021  3:45 PM *    Specimens:   ID Type Source Tests Collected by Time Destination   1 : GALLBLADDER AND CONTENTS Preservative Gallbladder  Jojo Izquierdo MD 2/17/2021 1512 Pathology   2 : LIVER BIOPSY Preservative Liver  Jojo Izquierdo MD 2/17/2021 1524 Pathology        Drains:  Help Text   This SmartLink retrieves the last documented value for [facility] assessment data, retrieved by either LDA type or by LDA group ID. This SmartLink should be used in a SmartText or SmartPhrase. If one is not available, please contact your . Complications: None; the patient tolerated the procedure well.     Josephine Shay MD  2/19/2021  2:56 PM

## 2021-07-20 ENCOUNTER — HOSPITAL ENCOUNTER (EMERGENCY)
Age: 46
Discharge: HOME | End: 2021-07-21
Attending: EMERGENCY MEDICINE
Payer: COMMERCIAL

## 2021-07-20 DIAGNOSIS — Z20.822 PERSON UNDER INVESTIGATION FOR COVID-19: ICD-10-CM

## 2021-07-20 DIAGNOSIS — J06.9 UPPER RESPIRATORY TRACT INFECTION, UNSPECIFIED TYPE: Primary | ICD-10-CM

## 2021-07-20 PROCEDURE — 99283 EMERGENCY DEPT VISIT LOW MDM: CPT

## 2021-07-20 PROCEDURE — 87635 SARS-COV-2 COVID-19 AMP PRB: CPT

## 2021-07-20 NOTE — Clinical Note
[facility] [place]  [facility] EMERGENCY DEPT  [address]  [facility] [place] [facility] [id number] [phone]    Work/School Note    Date: 7/20/2021     To Whom It May concern:    Vee Higuera was evaulated by the following provider(s):  Attending Provider: Johanna Solares MD  Physician Assistant: Keiko Thomson virus is suspected. Per the CDC guidelines we recommend home isolation until the following conditions are all met:    1. At least 10 days have passed since symptoms first appeared and  2. At least 24 hours have passed since last fever without the use of fever-reducing medications and  3.  Symptoms (e.g., cough, shortness of breath) have improved    Sincerely,          RILEY Finnegan

## 2021-07-21 VITALS
OXYGEN SATURATION: 99 % | HEART RATE: 72 BPM | WEIGHT: 172 LBS | RESPIRATION RATE: 16 BRPM | BODY MASS INDEX: 31.65 KG/M2 | HEIGHT: 62 IN | TEMPERATURE: 97.5 F | DIASTOLIC BLOOD PRESSURE: 79 MMHG | SYSTOLIC BLOOD PRESSURE: 129 MMHG

## 2021-07-21 LAB
COVID-19 RAPID TEST, COVR: NOT DETECTED
SOURCE, COVRS: NORMAL

## 2021-07-21 NOTE — ED PROVIDER NOTES
EMERGENCY DEPARTMENT HISTORY AND PHYSICAL EXAM    Date: 7/20/2021  Patient Name: Home Skelton    History of Presenting Illness     Chief Complaint   Patient presents with    Concern For HTROA-21 (Coronavirus)         History Provided By: Patient    Chief Complaint: concern for covid       Additional History (Context):   11:27 PM  Home Skelton is a 41 y.o. female presents to the emergency department C/O congestion runny nose and diarrhea that started yesterday. Denies fever cough. Denies vomiting or abdominal pain. Patient's  with similar symptoms. No known exposure to anyone with Covid but patient is not vaccinated. PCP: Marlene Colvin NP    Current Outpatient Medications   Medication Sig Dispense Refill    albuterol (PROVENTIL HFA, VENTOLIN HFA) 90 mcg/actuation inhaler Take 1 Puff by inhalation every four (4) hours as needed.  dicyclomine HCl (BENTYL PO) Take 40 mg by mouth as needed. (Patient not taking: Reported on 7/20/2021)      HYDROcodone-acetaminophen (NORCO) 5-325 mg per tablet Take  by mouth. (Patient not taking: Reported on 7/20/2021)         Past History     Past Medical History:  Past Medical History:   Diagnosis Date    Asthma as a child    uses inhaler weather dependent, usually 3-4 x a week    GERD (gastroesophageal reflux disease)     Ill-defined condition     history of kidney stones    Psychiatric disorder     depression/anxiety    Unspecified adverse effect of anesthesia     14yrs ago states \"heart stopped\" with anest. during c/s in [place]       Past Surgical History:  Past Surgical History:   Procedure Laterality Date    HX BREAST LUMPECTOMY Right few years ago    HX GYN      c/s x 2    HX ORTHOPAEDIC  2015    rt foot surgery       Family History:  History reviewed. No pertinent family history.     Social History:  Social History     Tobacco Use    Smoking status: Current Some Day Smoker     Packs/day: 1.00    Smokeless tobacco: Current User    Tobacco comment: advised none 24 pre   Vaping Use    Vaping Use: Never used   Substance Use Topics    Alcohol use: Yes     Alcohol/week: 5.0 standard drinks     Types: 6 Cans of beer per week     Comment: social    Drug use: No       Allergies: Allergies   Allergen Reactions    Percocet [Oxycodone-Acetaminophen] Nausea and Vomiting     Pt said she can take percocet. Pt vehemently denies an allergy to percocet and desires to have this removed from her allergy list.    Vicodin [Hydrocodone-Acetaminophen] Nausea and Vomiting     Can take patient states    Pt vehemently denies an allergy to Vicodin and desires to have this removed from her allergy list.       Review of Systems   Review of Systems   Constitutional: Negative for chills and fever. HENT: Positive for congestion, rhinorrhea, sinus pressure and sinus pain. Negative for sore throat. Respiratory: Negative for cough and shortness of breath. Cardiovascular: Negative for chest pain. Gastrointestinal: Positive for diarrhea. Negative for abdominal pain, nausea and vomiting. Musculoskeletal: Negative for back pain and myalgias. Neurological: Negative for weakness, numbness and headaches. All other systems reviewed and are negative. Physical Exam     Vitals:    07/20/21 2226   BP: 129/79   Pulse: 72   Resp: 16   Temp: 97.5 °F (36.4 °C)   SpO2: 99%   Weight: 78 kg (172 lb)   Height: 5' 2\" (1.575 m)     Physical Exam  Vitals and nursing note reviewed. Constitutional:       General: She is not in acute distress. Appearance: She is well-developed. Comments: Alert, well appearing, non toxic, speaking in full sentences without difficulty    HENT:      Head: Normocephalic and atraumatic. Right Ear: Tympanic membrane, ear canal and external ear normal. Tympanic membrane is not perforated, erythematous, retracted or bulging.       Left Ear: Tympanic membrane, ear canal and external ear normal. Tympanic membrane is not perforated, erythematous, retracted or bulging. Nose: Nose normal. No mucosal edema or rhinorrhea. Right Sinus: No maxillary sinus tenderness or frontal sinus tenderness. Left Sinus: No maxillary sinus tenderness or frontal sinus tenderness. Mouth/Throat:      Mouth: Mucous membranes are not dry. Pharynx: Uvula midline. No oropharyngeal exudate, posterior oropharyngeal erythema or uvula swelling. Tonsils: No tonsillar abscesses. Cardiovascular:      Rate and Rhythm: Normal rate and regular rhythm. Heart sounds: Normal heart sounds. No murmur heard. Pulmonary:      Effort: Pulmonary effort is normal. No respiratory distress. Breath sounds: Normal breath sounds. No wheezing or rales. Abdominal:      General: Bowel sounds are normal. There is no distension. Tenderness: There is no abdominal tenderness. Musculoskeletal:      Cervical back: Normal range of motion and neck supple. Lymphadenopathy:      Cervical: No cervical adenopathy. Skin:     General: Skin is warm and dry. Findings: No rash. Neurological:      Mental Status: She is alert and oriented to person, place, and time. Psychiatric:         Judgment: Judgment normal.         Diagnostic Study Results     Labs:     Recent Results (from the past 12 hour(s))   COVID-19 RAPID TEST    Collection Time: 07/20/21 11:40 PM   Result Value Ref Range    Specimen source Nasopharyngeal      COVID-19 rapid test Not detected NOTD         Radiologic Studies:   No orders to display     CT Results  (Last 48 hours)    None        CXR Results  (Last 48 hours)    None          Medical Decision Making   I am the first provider for this patient. I reviewed the vital signs, available nursing notes, past medical history, past surgical history, family history and social history. Vital Signs: Reviewed the patient's vital signs.     Pulse Oximetry Analysis: 99% on RA       Records Reviewed: Nursing Notes and Old Medical Records    Procedures:  Procedures    ED Course:   11:27 PM Initial assessment performed. The patients presenting problems have been discussed, and they are in agreement with the care plan formulated and outlined with them. I have encouraged them to ask questions as they arise throughout their visit. Discussion:  Pt presents with URI symptoms and diarrhea, benign exam. rapid Covid negative. Explained to patient that the rapid Covid test can sometimes give false negatives and I would recommend getting Covid PCR. Test was ordered but at the time of discharge patient refused the test.  Strongly encouraged patient to self isolate for 10 days if she does not want to take the test.  Patient says she has to go back to work and explained why this is a bad idea strict return precautions given, pt offering no questions or complaints. Diagnosis and Disposition     DISCHARGE NOTE:  Severiano Tamara Hill's  results have been reviewed with her. She has been counseled regarding her diagnosis, treatment, and plan. She verbally conveys understanding and agreement of the signs, symptoms, diagnosis, treatment and prognosis and additionally agrees to follow up as discussed. She also agrees with the care-plan and conveys that all of her questions have been answered. I have also provided discharge instructions for her that include: educational information regarding their diagnosis and treatment, and list of reasons why they would want to return to the ED prior to their follow-up appointment, should her condition change. She has been provided with education for proper emergency department utilization. CLINICAL IMPRESSION:    1. Upper respiratory tract infection, unspecified type    2. Person under investigation for COVID-19        PLAN:  1. D/C Home  2. Current Discharge Medication List        3.    Follow-up Information     Follow up With Specialties Details Why Contact Saran Venegas NP Nurse Practitioner Schedule an appointment as soon as possible for a visit   [address] [place] [address] Susan [address] [place] [address] EMERGENCY DEPT Emergency Medicine  If symptoms worsen [address] [address]  [phone]            Please note that this dictation was completed with [employer], the computer voice recognition software. Quite often unanticipated grammatical, syntax, homophones, and other interpretive errors are inadvertently transcribed by the computer software. Please disregard these errors. Please excuse any errors that have escaped final proofreading. I was personally available for consultation in the emergency department. I have reviewed the chart prior to the patient's discharge and agree with the documentation recorded by the [facility], including the assessment, treatment plan, and disposition.

## 2021-07-21 NOTE — ED TRIAGE NOTES
Patient arrives from home c/o headache and runny nose x2 days. Associated symptoms include diarrhea and joint pain today. Patient unsure if she has been exposed to [employer].  is also at ER with COVID like symptoms. Easy WOB. AOX4.

## 2021-09-06 ENCOUNTER — HOSPITAL ENCOUNTER (EMERGENCY)
Age: 46
Discharge: HOME | End: 2021-09-06
Attending: EMERGENCY MEDICINE
Payer: COMMERCIAL

## 2021-09-06 ENCOUNTER — APPOINTMENT (OUTPATIENT)
Dept: CT IMAGING | Age: 46
End: 2021-09-06
Attending: EMERGENCY MEDICINE
Payer: COMMERCIAL

## 2021-09-06 ENCOUNTER — APPOINTMENT (OUTPATIENT)
Dept: GENERAL RADIOLOGY | Age: 46
End: 2021-09-06
Attending: EMERGENCY MEDICINE
Payer: COMMERCIAL

## 2021-09-06 VITALS
TEMPERATURE: 98 F | SYSTOLIC BLOOD PRESSURE: 142 MMHG | HEART RATE: 60 BPM | OXYGEN SATURATION: 99 % | HEIGHT: 62 IN | WEIGHT: 178 LBS | RESPIRATION RATE: 16 BRPM | BODY MASS INDEX: 32.76 KG/M2 | DIASTOLIC BLOOD PRESSURE: 87 MMHG

## 2021-09-06 DIAGNOSIS — M54.2 CERVICALGIA: Primary | ICD-10-CM

## 2021-09-06 PROCEDURE — 72125 CT NECK SPINE W/O DYE: CPT

## 2021-09-06 PROCEDURE — 72050 X-RAY EXAM NECK SPINE 4/5VWS: CPT

## 2021-09-06 PROCEDURE — 93005 ELECTROCARDIOGRAM TRACING: CPT

## 2021-09-06 PROCEDURE — 99285 EMERGENCY DEPT VISIT HI MDM: CPT

## 2021-09-06 PROCEDURE — 74011250636 HC RX REV CODE- 250/636: Performed by: EMERGENCY MEDICINE

## 2021-09-06 PROCEDURE — 96372 THER/PROPH/DIAG INJ SC/IM: CPT

## 2021-09-06 RX ORDER — IBUPROFEN 800 MG/1
800 TABLET ORAL
Qty: 30 TABLET | Refills: 0 | Status: SHIPPED | OUTPATIENT
Start: 2021-09-06 | End: 2021-09-13

## 2021-09-06 RX ORDER — IBUPROFEN 800 MG/1
800 TABLET ORAL
Qty: 30 TABLET | Refills: 0 | Status: SHIPPED | OUTPATIENT
Start: 2021-09-06 | End: 2021-09-06 | Stop reason: CLARIF

## 2021-09-06 RX ORDER — METHOCARBAMOL 750 MG/1
750 TABLET, FILM COATED ORAL 4 TIMES DAILY
Qty: 24 TABLET | Refills: 0 | OUTPATIENT
Start: 2021-09-06 | End: 2021-09-24

## 2021-09-06 RX ORDER — METHOCARBAMOL 750 MG/1
750 TABLET, FILM COATED ORAL 4 TIMES DAILY
Qty: 24 TABLET | Refills: 0 | Status: SHIPPED | OUTPATIENT
Start: 2021-09-06 | End: 2021-09-06 | Stop reason: CLARIF

## 2021-09-06 RX ORDER — KETOROLAC TROMETHAMINE 30 MG/ML
30 INJECTION, SOLUTION INTRAMUSCULAR; INTRAVENOUS
Status: COMPLETED | OUTPATIENT
Start: 2021-09-06 | End: 2021-09-06

## 2021-09-06 RX ADMIN — KETOROLAC TROMETHAMINE 30 MG: 60 INJECTION, SOLUTION INTRAMUSCULAR at 07:09

## 2021-09-06 NOTE — ED TRIAGE NOTES
Patient arrives ambulatory to ED with c/c of left sided neck pain radiating to left shoulder and down left arm, onset yesterday. Patient reports taking ibuprofen 800mg with no relief. Patient states she can not move her neck side to side.

## 2021-09-06 NOTE — ED PROVIDER NOTES
EMERGENCY DEPARTMENT HISTORY AND PHYSICAL EXAM    Date: 9/6/2021  Patient Name: Romain Yan    History of Presenting Illness     Chief Complaint   Patient presents with    Neck Pain       History Provided By: Patient     History Mariia Lopez):   6:47 AM  Romain Yan is a 41 y.o. female with PMHX of Asthma who presents to the emergency department C/O left-sided neck pain onset 2 days ago. Associated sxs include neck spasm. Pt denies trauma, injury, falls or any other sxs or complaints. Chief Complaint: Neck pain  Onset: 2 days ago  Timing: Acute  Context: Symptoms started spontaneously, symptoms have progressively worsened since onset  Location: Left-sided neck  Quality: Cramping and Sharp  Severity: Moderate  Modifying Factors: Nothing makes it better, or worse. Associated Symptoms: denies any other associated signs or symptoms    PCP: Rene Sexton, NP    Past History         Past Medical History:  Past Medical History:   Diagnosis Date    Asthma as a child    uses inhaler weather dependent, usually 3-4 x a week    GERD (gastroesophageal reflux disease)     Ill-defined condition     history of kidney stones    Psychiatric disorder     depression/anxiety    Unspecified adverse effect of anesthesia     14yrs ago states \"heart stopped\" with anest. during c/s in [place]       Past Surgical History:  Past Surgical History:   Procedure Laterality Date    HX BREAST LUMPECTOMY Right few years ago    HX GYN      c/s x 2    HX ORTHOPAEDIC  2015    rt foot surgery       Family History:  History reviewed. No pertinent family history. Reviewed and non-contributory    Social History:  Social History     Tobacco Use    Smoking status: Current Every Day Smoker     Packs/day: 0.50    Smokeless tobacco: Current User   Vaping Use    Vaping Use: Never used   Substance Use Topics    Alcohol use:  Yes     Alcohol/week: 5.0 standard drinks     Types: 6 Cans of beer per week     Comment: social  Drug use: No       Allergies: Allergies   Allergen Reactions    Percocet [Oxycodone-Acetaminophen] Nausea and Vomiting     Pt said she can take percocet. Pt vehemently denies an allergy to percocet and desires to have this removed from her allergy list.    Vicodin [Hydrocodone-Acetaminophen] Nausea and Vomiting     Can take patient states    Pt vehemently denies an allergy to Vicodin and desires to have this removed from her allergy list.         Review of Systems      Review of Systems   Constitutional: Negative for chills and fever. HENT: Negative for congestion, rhinorrhea and sore throat. Eyes: Negative for pain and visual disturbance. Respiratory: Negative for cough, shortness of breath and wheezing. Cardiovascular: Negative for chest pain and palpitations. Gastrointestinal: Negative for abdominal pain, diarrhea and vomiting. Genitourinary: Negative for dysuria, flank pain, frequency and urgency. Musculoskeletal: Positive for neck pain. Negative for arthralgias and myalgias. Skin: Negative for rash and wound. Neurological: Negative for speech difficulty, weakness, light-headedness and headaches. Psychiatric/Behavioral: Negative for agitation and confusion. All other systems reviewed and are negative. Physical Exam     Vitals:    09/06/21 0435 09/06/21 0630 09/06/21 0710   BP: (!) 153/104 (!) 147/85    Pulse: 80 67    Resp: 18 18    Temp: 98.2 °F (36.8 °C)     SpO2: 100% 98% 99%   Weight: 80.7 kg (178 lb)     Height: 5' 2\" (1.575 m)         Physical Exam  Vitals and nursing note reviewed. Constitutional:       General: She is not in acute distress. Appearance: Normal appearance. She is normal weight. She is not ill-appearing. HENT:      Head: Normocephalic and atraumatic. Nose: Nose normal. No rhinorrhea. Mouth/Throat:      Mouth: Mucous membranes are moist.      Pharynx: No oropharyngeal exudate or posterior oropharyngeal erythema.    Eyes:      Extraocular Movements: Extraocular movements intact. Conjunctiva/sclera: Conjunctivae normal.      Pupils: Pupils are equal, round, and reactive to light. Cardiovascular:      Rate and Rhythm: Normal rate and regular rhythm. Heart sounds: No murmur heard. No friction rub. No gallop. Pulmonary:      Effort: Pulmonary effort is normal. No respiratory distress. Breath sounds: Normal breath sounds. No wheezing, rhonchi or rales. Abdominal:      General: Bowel sounds are normal.      Palpations: Abdomen is soft. Tenderness: There is no abdominal tenderness. There is no guarding or rebound. Musculoskeletal:         General: No swelling or deformity. Cervical back: Neck supple. Spasms and tenderness present. No swelling, edema, deformity, erythema, signs of trauma, rigidity, torticollis, bony tenderness or crepitus. Pain with movement present. Decreased range of motion. Lymphadenopathy:      Cervical: No cervical adenopathy. Skin:     General: Skin is warm and dry. Findings: No rash. Neurological:      General: No focal deficit present. Mental Status: She is alert and oriented to person, place, and time. Psychiatric:         Mood and Affect: Mood normal.         Behavior: Behavior normal.         Diagnostic Study Results     Labs -     Recent Results (from the past 12 hour(s))   EKG, 12 LEAD, INITIAL    Collection Time: 09/06/21  5:54 AM   Result Value Ref Range    Ventricular Rate 66 BPM    Atrial Rate 66 BPM    P-R Interval 134 ms    QRS Duration 86 ms    Q-T Interval 424 ms    QTC Calculation (Bezet) 444 ms    Calculated P Axis 40 degrees    Calculated R Axis 8 degrees    Calculated T Axis 38 degrees    Diagnosis       Normal sinus rhythm  Normal ECG  When compared with ECG of 05-FEB-2019 11:40,  No significant change was found         Radiologic Studies -   CT SPINE CERV WO CONT   Final Result      No significant abnormality.       XR SPINE CERV 4 OR 5 V   Final Result      The vertebral soft tissue swelling is upper limits of normal. Recommend CT of   the cervical spine for further evaluation. General body height and alignment is maintained in the cervical spine. CT Results  (Last 48 hours)               09/06/21 0746  CT SPINE CERV WO CONT Final result    Impression:      No significant abnormality. Narrative:  EXAM: CT SPINE CERV WO CONT       CLINICAL INDICATION/HISTORY:  atraumatic neck pain, xray showing soft tissue   swelling and recommending CT     > Additional: None       COMPARISON: None     > Correlative Imaging: None       TECHNIQUE: Volumetric scanning in the axial plane without IV contrast.  Sagittal   and coronal reconstructions. One or more dose reduction techniques were used on   this CT: automated exposure control, adjustment of the mAs and/or kVp according   to patient size, and iterative reconstruction techniques. The specific   techniques used on this CT exam have been documented in the patient's electronic   medical record. Digital imaging and communications in medicine (DICOM) format   image data are available to nonaffiliated external healthcare facilities or   entities on a secure, media free, reciprocally searchable basis with patient   authorization for at least a 12 month period after this study. _______________       FINDINGS:       Segments are normal in height and alignment. No significant degenerative   changes. No bony canal or bony foraminal stenosis. Prevertebral soft tissues are normal. Paraspinous soft tissues are unremarkable. No evidence of adenopathy. Incidentally noted peripherally calcified thyroid   nodule on the left, of no concern. Visualized cranial structures are unremarkable.  Visualized lung apices are   clear.       _______________               CXR Results  (Last 48 hours)    None          Medications given in the ED-  Medications   ketorolac tromethamine (TORADOL) 60 mg/2 mL injection 30 mg (30 mg IntraMUSCular Given 9/6/21 7613)         Medical Decision Making   I am the first provider for this patient. I reviewed the vital signs, available nursing notes, past medical history, past surgical history, family history and social history. Vital Signs-Reviewed the patient's vital signs. Records Reviewed: Nursing Notes    Pulse Oximetry Analysis - 98% on RA     Cardiac Monitor:  Rate: 67 bpm  Rhythm: sinus rhythm    EKG interpretation: (Preliminary)  Rhythm: NSR. Rate: 66 bpm; no STEMI  EKG read by Natalie Garcia MD at 5:54 AM    Provider Notes (Medical Decision Making):   DDX: Sprain, strain, subluxation    Procedures:  Procedures    ED Course:   6:47 AM Initial assessment performed. The patients presenting problems have been discussed, and they are in agreement with the care plan formulated and outlined with them. I have encouraged them to ask questions as they arise throughout their visit. Diagnosis and Disposition     Discussion:  41 y.o. female with atraumatic neck pain. Had a slightly abnormal x-ray prompting a CT scan which showed no abnormalities. Patient get relief with Toradol IM in the ED and will be discharged on ibuprofen and Robaxin at home. Patient may follow-up with a primary care doctor. Patient understands agrees this plan. DISCHARGE NOTE:  8:28 AM   Dia Hill's  results have been reviewed with her. She has been counseled regarding her diagnosis, treatment, and plan. She verbally conveys understanding and agreement of the signs, symptoms, diagnosis, treatment and prognosis and additionally agrees to follow up as discussed. She also agrees with the care-plan and conveys that all of her questions have been answered.   I have also provided discharge instructions for her that include: educational information regarding their diagnosis and treatment, and list of reasons why they would want to return to the ED prior to their follow-up appointment, should her condition change. She has been provided with education for proper emergency department utilization. CLINICAL IMPRESSION:    1. Cervicalgia        PLAN:  1. D/C Home  2. Current Discharge Medication List      START taking these medications    Details   ibuprofen (MOTRIN) 800 mg tablet Take 1 Tablet by mouth every eight (8) hours as needed for Pain for up to 10 days. Qty: 30 Tablet, Refills: 0  Start date: 9/6/2021, End date: 9/16/2021      methocarbamoL (ROBAXIN) 750 mg tablet Take 1 Tablet by mouth four (4) times daily. Qty: 24 Tablet, Refills: 0  Start date: 9/6/2021           3. Follow-up Information     Follow up With Specialties Details Why Contact Info    Salo Andres NP Nurse Practitioner Schedule an appointment as soon as possible for a visit  As soon as possible, For follow up from Emergency Department visit. [address] [place] [address] [id number] [phone]      [facility] EMERGENCY DEPT Emergency Medicine  As needed; If symptoms worsen [address] [id number] [address]     Please note that this dictation was completed with [employer], the computer voice recognition software. Quite often unanticipated grammatical, syntax, homophones, and other interpretive errors are inadvertently transcribed by the computer software. Please disregard these errors. Please excuse any errors that have escaped final proofreading.     Jose Wing MD

## 2021-09-07 LAB
ATRIAL RATE: 66 BPM
CALCULATED P AXIS, ECG09: 40 DEGREES
CALCULATED R AXIS, ECG10: 8 DEGREES
CALCULATED T AXIS, ECG11: 38 DEGREES
DIAGNOSIS, 93000: NORMAL
P-R INTERVAL, ECG05: 134 MS
Q-T INTERVAL, ECG07: 424 MS
QRS DURATION, ECG06: 86 MS
QTC CALCULATION (BEZET), ECG08: 444 MS
VENTRICULAR RATE, ECG03: 66 BPM

## 2021-09-23 ENCOUNTER — APPOINTMENT (OUTPATIENT)
Dept: ULTRASOUND IMAGING | Age: 46
End: 2021-09-23
Attending: PHYSICIAN ASSISTANT
Payer: COMMERCIAL

## 2021-09-23 ENCOUNTER — APPOINTMENT (OUTPATIENT)
Dept: CT IMAGING | Age: 46
End: 2021-09-23
Attending: PHYSICIAN ASSISTANT
Payer: COMMERCIAL

## 2021-09-23 ENCOUNTER — HOSPITAL ENCOUNTER (EMERGENCY)
Age: 46
Discharge: HOME | End: 2021-09-24
Attending: EMERGENCY MEDICINE | Admitting: EMERGENCY MEDICINE
Payer: COMMERCIAL

## 2021-09-23 DIAGNOSIS — D25.9 UTERINE LEIOMYOMA, UNSPECIFIED LOCATION: ICD-10-CM

## 2021-09-23 DIAGNOSIS — B96.89 BACTERIAL VAGINITIS: ICD-10-CM

## 2021-09-23 DIAGNOSIS — R10.30 LOWER ABDOMINAL PAIN: Primary | ICD-10-CM

## 2021-09-23 DIAGNOSIS — N76.0 BACTERIAL VAGINITIS: ICD-10-CM

## 2021-09-23 LAB
ALBUMIN SERPL-MCNC: 3.8 G/DL (ref 3.4–5)
ALBUMIN/GLOB SERPL: 1 {RATIO} (ref 0.8–1.7)
ALP SERPL-CCNC: 95 U/L (ref 45–117)
ALT SERPL-CCNC: 18 U/L (ref 13–56)
ANION GAP SERPL CALC-SCNC: 9 MMOL/L (ref 3–18)
APPEARANCE UR: ABNORMAL
AST SERPL-CCNC: 10 U/L (ref 10–38)
BASOPHILS # BLD: 0 K/UL (ref 0–0.1)
BASOPHILS NFR BLD: 0 % (ref 0–2)
BILIRUB SERPL-MCNC: 0.2 MG/DL (ref 0.2–1)
BILIRUB UR QL: NEGATIVE
BUN SERPL-MCNC: 13 MG/DL (ref 7–18)
BUN/CREAT SERPL: 10 (ref 12–20)
CALCIUM SERPL-MCNC: 9 MG/DL (ref 8.5–10.1)
CHLORIDE SERPL-SCNC: 107 MMOL/L (ref 100–111)
CO2 SERPL-SCNC: 24 MMOL/L (ref 21–32)
COLOR UR: YELLOW
CREAT SERPL-MCNC: 1.26 MG/DL (ref 0.6–1.3)
DIFFERENTIAL METHOD BLD: ABNORMAL
EOSINOPHIL # BLD: 0.1 K/UL (ref 0–0.4)
EOSINOPHIL NFR BLD: 1 % (ref 0–5)
ERYTHROCYTE [DISTWIDTH] IN BLOOD BY AUTOMATED COUNT: 13.4 % (ref 11.6–14.5)
GLOBULIN SER CALC-MCNC: 3.7 G/DL (ref 2–4)
GLUCOSE SERPL-MCNC: 90 MG/DL (ref 74–99)
GLUCOSE UR STRIP.AUTO-MCNC: NEGATIVE MG/DL
HCG UR QL: NEGATIVE
HCT VFR BLD AUTO: 44.1 % (ref 35–45)
HGB BLD-MCNC: 14.7 G/DL (ref 12–16)
HGB UR QL STRIP: NEGATIVE
KETONES UR QL STRIP.AUTO: ABNORMAL MG/DL
LEUKOCYTE ESTERASE UR QL STRIP.AUTO: NEGATIVE
LYMPHOCYTES # BLD: 2.3 K/UL (ref 0.9–3.6)
LYMPHOCYTES NFR BLD: 24 % (ref 21–52)
MCH RBC QN AUTO: 29.5 PG (ref 24–34)
MCHC RBC AUTO-ENTMCNC: 33.3 G/DL (ref 31–37)
MCV RBC AUTO: 88.4 FL (ref 78–100)
MONOCYTES # BLD: 0.8 K/UL (ref 0.05–1.2)
MONOCYTES NFR BLD: 8 % (ref 3–10)
NEUTS SEG # BLD: 6.5 K/UL (ref 1.8–8)
NEUTS SEG NFR BLD: 66 % (ref 40–73)
NITRITE UR QL STRIP.AUTO: NEGATIVE
PH UR STRIP: 7.5 [PH] (ref 5–8)
PLATELET # BLD AUTO: 336 K/UL (ref 135–420)
PMV BLD AUTO: 12 FL (ref 9.2–11.8)
POTASSIUM SERPL-SCNC: 4.1 MMOL/L (ref 3.5–5.5)
PROT SERPL-MCNC: 7.5 G/DL (ref 6.4–8.2)
PROT UR STRIP-MCNC: NEGATIVE MG/DL
RBC # BLD AUTO: 4.99 M/UL (ref 4.2–5.3)
SERVICE CMNT-IMP: NORMAL
SODIUM SERPL-SCNC: 140 MMOL/L (ref 136–145)
SP GR UR REFRACTOMETRY: 1.02 (ref 1–1.03)
UROBILINOGEN UR QL STRIP.AUTO: 1 EU/DL (ref 0.2–1)
WBC # BLD AUTO: 9.8 K/UL (ref 4.6–13.2)
WET PREP GENITAL: NORMAL

## 2021-09-23 PROCEDURE — 81003 URINALYSIS AUTO W/O SCOPE: CPT

## 2021-09-23 PROCEDURE — 85025 COMPLETE CBC W/AUTO DIFF WBC: CPT

## 2021-09-23 PROCEDURE — 81025 URINE PREGNANCY TEST: CPT

## 2021-09-23 PROCEDURE — 96375 TX/PRO/DX INJ NEW DRUG ADDON: CPT

## 2021-09-23 PROCEDURE — 74177 CT ABD & PELVIS W/CONTRAST: CPT

## 2021-09-23 PROCEDURE — 96376 TX/PRO/DX INJ SAME DRUG ADON: CPT

## 2021-09-23 PROCEDURE — 74011250636 HC RX REV CODE- 250/636: Performed by: PHYSICIAN ASSISTANT

## 2021-09-23 PROCEDURE — 74011000636 HC RX REV CODE- 636: Performed by: EMERGENCY MEDICINE

## 2021-09-23 PROCEDURE — 80053 COMPREHEN METABOLIC PANEL: CPT

## 2021-09-23 PROCEDURE — 99284 EMERGENCY DEPT VISIT MOD MDM: CPT

## 2021-09-23 PROCEDURE — 87491 CHLMYD TRACH DNA AMP PROBE: CPT

## 2021-09-23 PROCEDURE — 96374 THER/PROPH/DIAG INJ IV PUSH: CPT

## 2021-09-23 PROCEDURE — 87210 SMEAR WET MOUNT SALINE/INK: CPT

## 2021-09-23 PROCEDURE — 76830 TRANSVAGINAL US NON-OB: CPT

## 2021-09-23 RX ORDER — MORPHINE SULFATE 4 MG/ML
4 INJECTION INTRAVENOUS
Status: COMPLETED | OUTPATIENT
Start: 2021-09-23 | End: 2021-09-23

## 2021-09-23 RX ORDER — ONDANSETRON 2 MG/ML
4 INJECTION INTRAMUSCULAR; INTRAVENOUS
Status: COMPLETED | OUTPATIENT
Start: 2021-09-23 | End: 2021-09-23

## 2021-09-23 RX ORDER — KETOROLAC TROMETHAMINE 30 MG/ML
30 INJECTION, SOLUTION INTRAMUSCULAR; INTRAVENOUS
Status: COMPLETED | OUTPATIENT
Start: 2021-09-23 | End: 2021-09-23

## 2021-09-23 RX ADMIN — KETOROLAC TROMETHAMINE 30 MG: 30 INJECTION, SOLUTION INTRAMUSCULAR; INTRAVENOUS at 18:24

## 2021-09-23 RX ADMIN — MORPHINE SULFATE 4 MG: 4 INJECTION INTRAVENOUS at 19:14

## 2021-09-23 RX ADMIN — IOPAMIDOL 80 ML: 612 INJECTION, SOLUTION INTRAVENOUS at 23:58

## 2021-09-23 RX ADMIN — ONDANSETRON 4 MG: 2 INJECTION INTRAMUSCULAR; INTRAVENOUS at 19:14

## 2021-09-23 RX ADMIN — SODIUM CHLORIDE 1000 ML: 900 INJECTION, SOLUTION INTRAVENOUS at 18:24

## 2021-09-23 RX ADMIN — MORPHINE SULFATE 4 MG: 4 INJECTION INTRAVENOUS at 20:59

## 2021-09-23 NOTE — LETTER
[facility] [place]  [facility]  [address]  [facility] [id number] [phone]    Work/School Note    Date: 9/23/2021    To Whom It May concern:    Sana Moyer was seen and treated today in the emergency room by the following provider(s):  Attending Provider: Sandhya Lizama MD  Physician Assistant: Nohemi Saint H Levfloyd Cueva may return to work on 09/28/2021.     Sincerely,          Stephie Westfall PA-C

## 2021-09-23 NOTE — ED TRIAGE NOTES
Patient reports that she is having abdominal pain and back pain for 2 weeks. patient states she reports she is having vaginal d/c and odor

## 2021-09-24 VITALS
OXYGEN SATURATION: 96 % | TEMPERATURE: 97.7 F | RESPIRATION RATE: 18 BRPM | HEART RATE: 65 BPM | WEIGHT: 179 LBS | HEIGHT: 62 IN | DIASTOLIC BLOOD PRESSURE: 74 MMHG | BODY MASS INDEX: 32.94 KG/M2 | SYSTOLIC BLOOD PRESSURE: 118 MMHG

## 2021-09-24 LAB
C TRACH RRNA SPEC QL NAA+PROBE: NEGATIVE
N GONORRHOEA RRNA SPEC QL NAA+PROBE: NEGATIVE
SPECIMEN SOURCE: NORMAL

## 2021-09-24 PROCEDURE — 74011250637 HC RX REV CODE- 250/637: Performed by: PHYSICIAN ASSISTANT

## 2021-09-24 RX ORDER — KETOROLAC TROMETHAMINE 10 MG/1
10 TABLET, FILM COATED ORAL
Qty: 12 TABLET | Refills: 0 | Status: SHIPPED | OUTPATIENT
Start: 2021-09-24

## 2021-09-24 RX ORDER — OXYCODONE AND ACETAMINOPHEN 5; 325 MG/1; MG/1
1 TABLET ORAL
Qty: 12 TABLET | Refills: 0 | Status: SHIPPED | OUTPATIENT
Start: 2021-09-24 | End: 2021-09-27

## 2021-09-24 RX ORDER — ONDANSETRON 4 MG/1
4 TABLET, ORALLY DISINTEGRATING ORAL
Qty: 20 TABLET | Refills: 0 | Status: SHIPPED | OUTPATIENT
Start: 2021-09-24

## 2021-09-24 RX ORDER — OXYCODONE AND ACETAMINOPHEN 5; 325 MG/1; MG/1
2 TABLET ORAL
Status: COMPLETED | OUTPATIENT
Start: 2021-09-24 | End: 2021-09-24

## 2021-09-24 RX ADMIN — OXYCODONE HYDROCHLORIDE AND ACETAMINOPHEN 2 TABLET: 5; 325 TABLET ORAL at 00:31

## 2022-12-25 NOTE — ED TRIAGE NOTES
Patient ambulated to ED with right breast pain x1 month, hx right breast excision with wire localization surgery 08/18, patient states that she has pain, burning, and itching on right breast, denies drainage for surgical site, a/ox3, patient speaking in complete sentences and able to make needs known, patient in NAD  
 Labs/EKG

## 2023-07-18 NOTE — ED NOTES
I have reviewed discharge instructions with the patient. The patient verbalized understanding. Lov 4/26/2023

## (undated) DEVICE — PAD PT POS 36 IN SURGYPAD DISP

## (undated) DEVICE — LAP CHOLE: Brand: MEDLINE INDUSTRIES, INC.

## (undated) DEVICE — GARMENT,MEDLINE,DVT,INT,CALF,MED, GEN2: Brand: MEDLINE

## (undated) DEVICE — TROCAR: Brand: KII® OPTICAL ACCESS SYSTEM

## (undated) DEVICE — E-Z CLEAN, PTFE COATED, ELECTROSURGICAL LAPAROSCOPIC ELECTRODE, J-HOOK, 33 CM., SINGLE-USE, FOR USE WITH HAND CONTROL PENCIL: Brand: MEGADYNE

## (undated) DEVICE — SUT MONOCRYL PLUS UD 4-0 --

## (undated) DEVICE — SCISSORS ENDOSCP DIA5MM CRV MPLR CAUT W/ RATCH HNDL

## (undated) DEVICE — VISUALIZATION SYSTEM: Brand: CLEARIFY

## (undated) DEVICE — DISPOSABLE SUCTION/IRRIGATOR TUBE SET WITH TIP: Brand: AHTO

## (undated) DEVICE — APPLIER CLP M L L11.4IN DIA10MM ENDOSCP ROT MULT FOR LIG

## (undated) DEVICE — [HIGH FLOW INSUFFLATOR,  DO NOT USE IF PACKAGE IS DAMAGED,  KEEP DRY,  KEEP AWAY FROM SUNLIGHT,  PROTECT FROM HEAT AND RADIOACTIVE SOURCES.]: Brand: PNEUMOSURE

## (undated) DEVICE — STERILE POLYISOPRENE POWDER-FREE SURGICAL GLOVES: Brand: PROTEXIS

## (undated) DEVICE — STERILE POLYISOPRENE POWDER-FREE SURGICAL GLOVES WITH EMOLLIENT COATING: Brand: PROTEXIS

## (undated) DEVICE — INSUFFLATION NEEDLE TO ESTABLISH PNEUMOPERITONEUM.: Brand: INSUFFLATION NEEDLE

## (undated) DEVICE — LAPAROSCOPIC TROCAR SLEEVE/SINGLE USE: Brand: KII® OPTICAL ACCESS SYSTEM

## (undated) DEVICE — SOLUTION LACTATED RINGERS INJECTION USP

## (undated) DEVICE — SUTURE PDS II SZ 0 L27IN ABSRB VLT L26MM CT-2 1/2 CIR Z334H

## (undated) DEVICE — DERMABOND SKIN ADH 0.7ML --

## (undated) DEVICE — TROCAR: Brand: KII® SLEEVE